# Patient Record
Sex: MALE | Race: WHITE | Employment: FULL TIME | URBAN - METROPOLITAN AREA
[De-identification: names, ages, dates, MRNs, and addresses within clinical notes are randomized per-mention and may not be internally consistent; named-entity substitution may affect disease eponyms.]

---

## 2018-08-06 ENCOUNTER — HOSPITAL ENCOUNTER (EMERGENCY)
Age: 23
Discharge: HOME | End: 2018-08-06

## 2018-08-06 VITALS
OXYGEN SATURATION: 100 % | WEIGHT: 150 LBS | HEART RATE: 62 BPM | TEMPERATURE: 97 F | RESPIRATION RATE: 16 BRPM | HEIGHT: 66 IN | SYSTOLIC BLOOD PRESSURE: 124 MMHG | BODY MASS INDEX: 24.11 KG/M2 | DIASTOLIC BLOOD PRESSURE: 68 MMHG

## 2018-08-06 DIAGNOSIS — H57.12 ACUTE LEFT EYE PAIN: Primary | ICD-10-CM

## 2018-08-06 PROCEDURE — 6370000000 HC RX 637 (ALT 250 FOR IP): Performed by: PHYSICIAN ASSISTANT

## 2018-08-06 PROCEDURE — 99283 EMERGENCY DEPT VISIT LOW MDM: CPT

## 2018-08-06 RX ORDER — IBUPROFEN 600 MG/1
600 TABLET ORAL EVERY 8 HOURS PRN
Qty: 30 TABLET | Refills: 0 | Status: SHIPPED | OUTPATIENT
Start: 2018-08-06 | End: 2019-02-20

## 2018-08-06 RX ORDER — SULFACETAMIDE SODIUM 100 MG/ML
2 SOLUTION/ DROPS OPHTHALMIC
Qty: 1 BOTTLE | Refills: 0 | Status: SHIPPED | OUTPATIENT
Start: 2018-08-06 | End: 2018-08-11

## 2018-08-06 RX ADMIN — FLUORESCEIN SODIUM 1 MG: 1 STRIP OPHTHALMIC at 20:22

## 2018-08-06 ASSESSMENT — PAIN SCALES - GENERAL: PAINLEVEL_OUTOF10: 7

## 2018-08-06 ASSESSMENT — PAIN DESCRIPTION - PAIN TYPE: TYPE: ACUTE PAIN

## 2018-08-06 ASSESSMENT — PAIN DESCRIPTION - DESCRIPTORS: DESCRIPTORS: SHARP

## 2018-08-06 ASSESSMENT — PAIN DESCRIPTION - LOCATION: LOCATION: EYE

## 2018-08-06 ASSESSMENT — VISUAL ACUITY
OU: 20/40
OS: 20/50
OD: 20/50

## 2018-08-06 ASSESSMENT — PAIN DESCRIPTION - ORIENTATION: ORIENTATION: LEFT

## 2019-02-20 ENCOUNTER — HOSPITAL ENCOUNTER (EMERGENCY)
Age: 24
Discharge: HOME | End: 2019-02-21

## 2019-02-20 VITALS
HEART RATE: 105 BPM | RESPIRATION RATE: 16 BRPM | HEIGHT: 66 IN | DIASTOLIC BLOOD PRESSURE: 89 MMHG | BODY MASS INDEX: 25.23 KG/M2 | WEIGHT: 157 LBS | TEMPERATURE: 98.2 F | SYSTOLIC BLOOD PRESSURE: 128 MMHG | OXYGEN SATURATION: 97 %

## 2019-02-20 DIAGNOSIS — B86 SCABIES: ICD-10-CM

## 2019-02-20 DIAGNOSIS — L29.9 ITCHING: ICD-10-CM

## 2019-02-20 DIAGNOSIS — B36.8: Primary | ICD-10-CM

## 2019-02-20 PROCEDURE — 99282 EMERGENCY DEPT VISIT SF MDM: CPT

## 2019-02-21 RX ORDER — HYDROXYZINE HYDROCHLORIDE 25 MG/1
25 TABLET, FILM COATED ORAL EVERY 6 HOURS PRN
Qty: 20 TABLET | Refills: 0 | Status: SHIPPED | OUTPATIENT
Start: 2019-02-21 | End: 2019-03-03

## 2020-01-14 ENCOUNTER — HOSPITAL ENCOUNTER (EMERGENCY)
Age: 25
Discharge: HOME | End: 2020-01-15
Attending: EMERGENCY MEDICINE
Payer: MEDICAID

## 2020-01-14 VITALS
WEIGHT: 155 LBS | RESPIRATION RATE: 16 BRPM | TEMPERATURE: 98.9 F | HEART RATE: 89 BPM | HEIGHT: 66 IN | OXYGEN SATURATION: 97 % | SYSTOLIC BLOOD PRESSURE: 114 MMHG | DIASTOLIC BLOOD PRESSURE: 71 MMHG | BODY MASS INDEX: 24.91 KG/M2

## 2020-01-14 LAB
BILIRUBIN URINE: NEGATIVE
BLOOD, URINE: NEGATIVE
CLARITY: CLEAR
COLOR: YELLOW
GLUCOSE URINE: NEGATIVE MG/DL
KETONES, URINE: NEGATIVE MG/DL
LEUKOCYTE ESTERASE, URINE: NEGATIVE
MICROSCOPIC EXAMINATION: NORMAL
NITRITE, URINE: NEGATIVE
PH UA: 6 (ref 5–8)
PROTEIN UA: NEGATIVE MG/DL
RAPID INFLUENZA  B AGN: NEGATIVE
RAPID INFLUENZA A AGN: NEGATIVE
SPECIFIC GRAVITY UA: >=1.03 (ref 1–1.03)
URINE TYPE: NORMAL
UROBILINOGEN, URINE: 1 E.U./DL

## 2020-01-14 PROCEDURE — 87804 INFLUENZA ASSAY W/OPTIC: CPT

## 2020-01-14 PROCEDURE — 6370000000 HC RX 637 (ALT 250 FOR IP)

## 2020-01-14 PROCEDURE — 99283 EMERGENCY DEPT VISIT LOW MDM: CPT

## 2020-01-14 PROCEDURE — 81003 URINALYSIS AUTO W/O SCOPE: CPT

## 2020-01-14 RX ORDER — ONDANSETRON 4 MG/1
4 TABLET, ORALLY DISINTEGRATING ORAL ONCE
Status: COMPLETED | OUTPATIENT
Start: 2020-01-15 | End: 2020-01-15

## 2020-01-14 RX ORDER — ACETAMINOPHEN 500 MG
1000 TABLET ORAL ONCE
Status: COMPLETED | OUTPATIENT
Start: 2020-01-14 | End: 2020-01-14

## 2020-01-14 RX ORDER — ACETAMINOPHEN 500 MG
TABLET ORAL
Status: COMPLETED
Start: 2020-01-14 | End: 2020-01-14

## 2020-01-14 RX ORDER — DICYCLOMINE HYDROCHLORIDE 10 MG/1
20 CAPSULE ORAL ONCE
Status: COMPLETED | OUTPATIENT
Start: 2020-01-15 | End: 2020-01-15

## 2020-01-14 RX ADMIN — Medication 1000 MG: at 20:23

## 2020-01-14 RX ADMIN — ACETAMINOPHEN 1000 MG: 500 TABLET ORAL at 20:23

## 2020-01-14 ASSESSMENT — PAIN SCALES - GENERAL
PAINLEVEL_OUTOF10: 0
PAINLEVEL_OUTOF10: 0
PAINLEVEL_OUTOF10: 7

## 2020-01-14 ASSESSMENT — PAIN DESCRIPTION - PAIN TYPE: TYPE: ACUTE PAIN

## 2020-01-14 ASSESSMENT — PAIN DESCRIPTION - LOCATION: LOCATION: GENERALIZED

## 2020-01-14 NOTE — LETTER
[facility] [place] [facility]  [address] [facility] [address]  Phone: [phone]             January 15, 2020    Patient: [facility]   YOB: 1995   Date of Visit: 1/14/2020       To Whom It May Concern:    Nan Irvin was seen and treated in our emergency department on 1/14/2020. He may return to work on 1/16/2020.       Sincerely,     Velia DOSS        Signature:__________________________________

## 2020-01-15 PROCEDURE — 6370000000 HC RX 637 (ALT 250 FOR IP): Performed by: EMERGENCY MEDICINE

## 2020-01-15 RX ORDER — DEXTROMETHORPHAN HYDROBROMIDE AND PROMETHAZINE HYDROCHLORIDE 15; 6.25 MG/5ML; MG/5ML
5 SYRUP ORAL 4 TIMES DAILY PRN
Qty: 240 ML | Refills: 0 | Status: SHIPPED | OUTPATIENT
Start: 2020-01-15 | End: 2020-01-22

## 2020-01-15 RX ORDER — DICYCLOMINE HYDROCHLORIDE 10 MG/1
20 CAPSULE ORAL 4 TIMES DAILY PRN
Qty: 40 CAPSULE | Refills: 0 | Status: SHIPPED | OUTPATIENT
Start: 2020-01-15 | End: 2020-01-15 | Stop reason: SDUPTHER

## 2020-01-15 RX ORDER — BENZONATATE 100 MG/1
200 CAPSULE ORAL 3 TIMES DAILY PRN
Qty: 30 CAPSULE | Refills: 0 | Status: SHIPPED | OUTPATIENT
Start: 2020-01-15 | End: 2020-01-15 | Stop reason: SDUPTHER

## 2020-01-15 RX ORDER — DEXTROMETHORPHAN HYDROBROMIDE AND PROMETHAZINE HYDROCHLORIDE 15; 6.25 MG/5ML; MG/5ML
5 SYRUP ORAL 4 TIMES DAILY PRN
Qty: 240 ML | Refills: 0 | Status: SHIPPED | OUTPATIENT
Start: 2020-01-15 | End: 2020-01-15 | Stop reason: SDUPTHER

## 2020-01-15 RX ORDER — BENZONATATE 100 MG/1
200 CAPSULE ORAL 3 TIMES DAILY PRN
Qty: 30 CAPSULE | Refills: 0 | Status: SHIPPED | OUTPATIENT
Start: 2020-01-15 | End: 2020-01-22

## 2020-01-15 RX ORDER — DICYCLOMINE HYDROCHLORIDE 10 MG/1
20 CAPSULE ORAL 4 TIMES DAILY PRN
Qty: 40 CAPSULE | Refills: 0 | Status: SHIPPED | OUTPATIENT
Start: 2020-01-15 | End: 2020-03-16

## 2020-01-15 RX ADMIN — ONDANSETRON 4 MG: 4 TABLET, ORALLY DISINTEGRATING ORAL at 01:13

## 2020-01-15 RX ADMIN — DICYCLOMINE HYDROCHLORIDE 20 MG: 10 CAPSULE ORAL at 01:12

## 2020-01-15 NOTE — ED PROVIDER NOTES
per week: Not on file     Minutes per session: Not on file    Stress: Not on file   Relationships    Social connections:     Talks on phone: Not on file     Gets together: Not on file     Attends Synagogue service: Not on file     Active member of club or organization: Not on file     Attends meetings of clubs or organizations: Not on file     Relationship status: Not on file    Intimate partner violence:     Fear of current or ex partner: Not on file     Emotionally abused: Not on file     Physically abused: Not on file     Forced sexual activity: Not on file   Other Topics Concern    Not on file   Social History Narrative    Not on file     No current facility-administered medications for this encounter. Current Outpatient Medications   Medication Sig Dispense Refill    dicyclomine (BENTYL) 10 MG capsule Take 2 capsules by mouth 4 times daily as needed (abdominal pain/cramping) 40 capsule 0     No Known Allergies    REVIEW OF SYSTEMS  10 systems reviewed, pertinent positives per HPI otherwise noted to be negative. PHYSICAL EXAM  /71   Pulse 89   Temp 98.9 °F (37.2 °C) (Oral)   Resp 16   Ht 5' 6\" (1.676 m)   Wt 155 lb (70.3 kg)   SpO2 97%   BMI 25.02 kg/m²   GENERAL APPEARANCE: Awake and alert. Cooperative. No acute distress, appears fatigued  HEAD: Normocephalic. Atraumatic. EYES: PERRL. EOM's grossly intact. Glassy eyes  ENT: Mucous membranes are moist.  Airway patent, no tonsillar exudates, midline uvula, TMs without bulging/erythema bilaterally  NECK: Supple. No rigidity, normal range of motion  HEART: RRR. No murmurs  LUNGS: Respirations nonlabored. Lungs are clear to auscultation bilaterally. Occasional dry hacking cough  ABDOMEN: Soft. Non-distended. Non-tender. No guarding or rebound. Normal bowel sounds. EXTREMITIES: No peripheral edema. Moves all extremities equally. All extremities neurovascularly intact. SKIN: Warm and dry. No acute rashes.    NEUROLOGICAL: Alert and

## 2020-03-16 ENCOUNTER — HOSPITAL ENCOUNTER (EMERGENCY)
Age: 25
Discharge: HOME | End: 2020-03-16
Attending: EMERGENCY MEDICINE
Payer: MEDICAID

## 2020-03-16 VITALS
HEIGHT: 66 IN | WEIGHT: 161 LBS | RESPIRATION RATE: 16 BRPM | HEART RATE: 75 BPM | SYSTOLIC BLOOD PRESSURE: 107 MMHG | DIASTOLIC BLOOD PRESSURE: 65 MMHG | OXYGEN SATURATION: 100 % | BODY MASS INDEX: 25.88 KG/M2 | TEMPERATURE: 98.4 F

## 2020-03-16 LAB — S PYO AG THROAT QL: POSITIVE

## 2020-03-16 PROCEDURE — 6370000000 HC RX 637 (ALT 250 FOR IP): Performed by: EMERGENCY MEDICINE

## 2020-03-16 PROCEDURE — 87880 STREP A ASSAY W/OPTIC: CPT

## 2020-03-16 PROCEDURE — 99282 EMERGENCY DEPT VISIT SF MDM: CPT

## 2020-03-16 RX ORDER — AMOXICILLIN 500 MG/1
500 CAPSULE ORAL 2 TIMES DAILY
Qty: 20 CAPSULE | Refills: 0 | Status: SHIPPED | OUTPATIENT
Start: 2020-03-16 | End: 2020-03-26

## 2020-03-16 RX ORDER — AMOXICILLIN 500 MG/1
500 CAPSULE ORAL ONCE
Status: COMPLETED | OUTPATIENT
Start: 2020-03-16 | End: 2020-03-16

## 2020-03-16 RX ADMIN — AMOXICILLIN 500 MG: 500 CAPSULE ORAL at 09:28

## 2020-03-16 ASSESSMENT — PAIN DESCRIPTION - PAIN TYPE: TYPE: ACUTE PAIN

## 2020-03-16 ASSESSMENT — PAIN DESCRIPTION - LOCATION: LOCATION: THROAT

## 2020-03-16 ASSESSMENT — PAIN SCALES - GENERAL
PAINLEVEL_OUTOF10: 5
PAINLEVEL_OUTOF10: 5

## 2020-03-16 NOTE — ED PROVIDER NOTES
 Social connections     Talks on phone: Not on file     Gets together: Not on file     Attends Moravian service: Not on file     Active member of club or organization: Not on file     Attends meetings of clubs or organizations: Not on file     Relationship status: Not on file    Intimate partner violence     Fear of current or ex partner: Not on file     Emotionally abused: Not on file     Physically abused: Not on file     Forced sexual activity: Not on file   Other Topics Concern    Not on file   Social History Narrative    Not on file     No current facility-administered medications for this encounter. Current Outpatient Medications   Medication Sig Dispense Refill    amoxicillin (AMOXIL) 500 MG capsule Take 1 capsule by mouth 2 times daily for 10 days 20 capsule 0     No Known Allergies    REVIEW OF SYSTEMS  10 systems reviewed, pertinent positives per HPI otherwise noted to be negative. PHYSICAL EXAM  /85   Pulse 95   Temp 98.4 °F (36.9 °C)   Resp 15   Ht 5' 6\" (1.676 m)   Wt 161 lb (73 kg)   SpO2 100%   BMI 25.99 kg/m²    Physical exam:  General appearance: awake and cooperative. No distress. Non toxic appearing. Skin: Warm and dry. No rashes or lesions. HENT: TMs clear without erythema bulging or effusion. Oropharyngeal erythema with tonsillar exudate on left; no uvular deviation or evidence of PTA. No drooling or stridor. No trismus. Normocephalic. Atraumatic. Mucus membranes are moist   Neck: supple. Right sided LAD. Normal ROM. Eyes: VARUN. EOM intact. Heart: RRR. No murmurs. Lungs: Respirations unlabored. CTAB. No wheezes, rales, or rhonchi. Good air exchange  Abdomen: Soft. Non distended. No peritoneal signs. Musculoskeletal: No extremity edema. Compartments soft. No deformity. No tenderness in the extremities. All extremities neurovascularly intact. Radial, Dp, and PT pulses +2/4 bilaterally  Neurological: Alert and oriented. No focal deficits.  No aphasia or dysarthria. No gait ataxia. Psychiatric: Normal mood and affect. LABS  I have reviewed all labs for this visit. Results for orders placed or performed during the hospital encounter of 03/16/20   Strep screen group a throat   Result Value Ref Range    Rapid Strep A Screen POSITIVE (A) Negative       ED COURSE/MDM  Patient seen and evaluated. Old records reviewed. Labs and imaging reviewed and results discussed with patient. Patient presents with sore throat. Vital signs are within normal limits he is afebrile. He is well-appearing. His rapid strep is positive and he was treated with amoxicillin, first dose given here. No evidence of PTA or retropharyngeal abscess. I spoke with him regarding strict return precautions. He voices understanding. He is discharged in stable condition. During the patient's ED course, the patient was given:  Medications   amoxicillin (AMOXIL) capsule 500 mg (500 mg Oral Given 3/16/20 0928)        CLINICAL IMPRESSION  1. Strep pharyngitis        Blood pressure 134/85, pulse 95, temperature 98.4 °F (36.9 °C), resp. rate 15, height 5' 6\" (1.676 m), weight 161 lb (73 kg), SpO2 100 %. Patient was given scripts for the following medications. I counseled patient how to take these medications. New Prescriptions    AMOXICILLIN (AMOXIL) 500 MG CAPSULE    Take 1 capsule by mouth 2 times daily for 10 days       Follow-up with:  No follow-up provider specified. DISCLAIMER: This chart was created using Dragon dictation software. Efforts were made by me to ensure accuracy, however some errors may be present due to limitations of this technology and occasionally words are not transcribed correctly.        Viktoria [place]  03/16/20 7608

## 2020-08-20 ENCOUNTER — TELEPHONE (OUTPATIENT)
Dept: GASTROENTEROLOGY | Age: 25
End: 2020-08-20

## 2020-08-20 ENCOUNTER — HOSPITAL ENCOUNTER (EMERGENCY)
Age: 25
Discharge: HOME | End: 2020-08-20
Attending: STUDENT IN AN ORGANIZED HEALTH CARE EDUCATION/TRAINING PROGRAM
Payer: COMMERCIAL

## 2020-08-20 VITALS
OXYGEN SATURATION: 99 % | SYSTOLIC BLOOD PRESSURE: 132 MMHG | BODY MASS INDEX: 26.52 KG/M2 | RESPIRATION RATE: 16 BRPM | HEIGHT: 66 IN | TEMPERATURE: 98 F | WEIGHT: 165 LBS | DIASTOLIC BLOOD PRESSURE: 84 MMHG | HEART RATE: 76 BPM

## 2020-08-20 LAB
A/G RATIO: 2.4 (ref 1.1–2.2)
ALBUMIN SERPL-MCNC: 4.8 G/DL (ref 3.4–5)
ALP BLD-CCNC: 64 U/L (ref 40–129)
ALT SERPL-CCNC: 16 U/L (ref 10–40)
ANION GAP SERPL CALCULATED.3IONS-SCNC: 8 MMOL/L (ref 3–16)
AST SERPL-CCNC: 17 U/L (ref 15–37)
BASOPHILS ABSOLUTE: 0.1 K/UL (ref 0–0.2)
BASOPHILS RELATIVE PERCENT: 1 %
BILIRUB SERPL-MCNC: 1.4 MG/DL (ref 0–1)
BUN BLDV-MCNC: 13 MG/DL (ref 7–20)
CALCIUM SERPL-MCNC: 9.2 MG/DL (ref 8.3–10.6)
CHLORIDE BLD-SCNC: 103 MMOL/L (ref 99–110)
CO2: 28 MMOL/L (ref 21–32)
CREAT SERPL-MCNC: 0.7 MG/DL (ref 0.9–1.3)
EOSINOPHILS ABSOLUTE: 0.2 K/UL (ref 0–0.6)
EOSINOPHILS RELATIVE PERCENT: 3.1 %
GFR AFRICAN AMERICAN: >60
GFR NON-AFRICAN AMERICAN: >60
GLOBULIN: 2 G/DL
GLUCOSE BLD-MCNC: 99 MG/DL (ref 70–99)
HCT VFR BLD CALC: 46.7 % (ref 40.5–52.5)
HEMOGLOBIN: 15.9 G/DL (ref 13.5–17.5)
LACTIC ACID: 0.7 MMOL/L (ref 0.4–2)
LIPASE: 20 U/L (ref 13–60)
LYMPHOCYTES ABSOLUTE: 2.2 K/UL (ref 1–5.1)
LYMPHOCYTES RELATIVE PERCENT: 36.1 %
MCH RBC QN AUTO: 32.1 PG (ref 26–34)
MCHC RBC AUTO-ENTMCNC: 34.2 G/DL (ref 31–36)
MCV RBC AUTO: 94 FL (ref 80–100)
MONOCYTES ABSOLUTE: 0.6 K/UL (ref 0–1.3)
MONOCYTES RELATIVE PERCENT: 10.2 %
NEUTROPHILS ABSOLUTE: 3 K/UL (ref 1.7–7.7)
NEUTROPHILS RELATIVE PERCENT: 49.6 %
OCCULT BLOOD DIAGNOSTIC: ABNORMAL
PDW BLD-RTO: 13 % (ref 12.4–15.4)
PLATELET # BLD: 186 K/UL (ref 135–450)
PMV BLD AUTO: 9.4 FL (ref 5–10.5)
POTASSIUM REFLEX MAGNESIUM: 4 MMOL/L (ref 3.5–5.1)
RBC # BLD: 4.97 M/UL (ref 4.2–5.9)
SEDIMENTATION RATE, ERYTHROCYTE: 1 MM/HR (ref 0–15)
SODIUM BLD-SCNC: 139 MMOL/L (ref 136–145)
TOTAL PROTEIN: 6.8 G/DL (ref 6.4–8.2)
WBC # BLD: 6.1 K/UL (ref 4–11)

## 2020-08-20 PROCEDURE — 80053 COMPREHEN METABOLIC PANEL: CPT

## 2020-08-20 PROCEDURE — 83690 ASSAY OF LIPASE: CPT

## 2020-08-20 PROCEDURE — G0328 FECAL BLOOD SCRN IMMUNOASSAY: HCPCS

## 2020-08-20 PROCEDURE — 99284 EMERGENCY DEPT VISIT MOD MDM: CPT

## 2020-08-20 PROCEDURE — 85652 RBC SED RATE AUTOMATED: CPT

## 2020-08-20 PROCEDURE — 83605 ASSAY OF LACTIC ACID: CPT

## 2020-08-20 PROCEDURE — 85025 COMPLETE CBC W/AUTO DIFF WBC: CPT

## 2020-08-20 RX ORDER — IBUPROFEN 400 MG/1
400 TABLET ORAL EVERY 6 HOURS PRN
COMMUNITY
End: 2020-08-21 | Stop reason: CLARIF

## 2020-08-20 ASSESSMENT — PAIN SCALES - GENERAL: PAINLEVEL_OUTOF10: 5

## 2020-08-20 ASSESSMENT — PAIN DESCRIPTION - LOCATION: LOCATION: ABDOMEN

## 2020-08-20 ASSESSMENT — PAIN DESCRIPTION - PAIN TYPE: TYPE: ACUTE PAIN

## 2020-08-20 NOTE — TELEPHONE ENCOUNTER
Per verbal order from Dr. Jessica Woodward schedule a fua with him.  Will reach out to patient once d/c

## 2020-08-20 NOTE — LETTER
[facility] [place] [facility]  [address] [facility] [address]  Phone: [phone]               August 20, 2020    Patient: Ashanti Dunlap [facility]   YOB: 1995   Date of Visit: 8/20/2020       To Whom It May Concern:    Yoselin Coburn was seen and treated in our emergency department on 8/20/2020. He may return to work on 8/21.       Sincerely,       Susan Raymundo RN         Signature:__________________________________

## 2020-08-20 NOTE — ED TRIAGE NOTES
Chief Complaint   Patient presents with    Rectal Bleeding     past month. Pt states bright red blood in stool.  has been dark in color prior to this    Abdominal Pain     cramping shooting stabbing pain x 1 month

## 2020-08-20 NOTE — ED PROVIDER NOTES
[address] ED      CHIEF COMPLAINT  Rectal Bleeding (past month. Pt states bright red blood in stool. has been dark in color prior to this) and Abdominal Pain (cramping shooting stabbing pain x 1 month )       HISTORY OF PRESENT ILLNESS  Karen Olmedo is a 30 y.o. male with a past medical history of asthma who presents to the ED complaining of abdominal pain and rectal bleeding. Abdominal pain for ~1m. Worst in lower quadrants, but sometimes generalized. Cramping/ stabbing pain, intermittent but daily. 5/10. Has been taking pepto bismal. Reports diarrhea. Reports nausea without vomiting. Denies fevers or sick contact. Denies cough. Watery stool with blood in the bowel. History of hemorrhoids and fissue. BRB in stool for >1m, present with every BM. 3-4 episodes of diarrhea/day. Says sometimes only a small amount of blood sometimes large amounts. Denies burning with defecation. No recent abx or exotic travel. Denies drinking lake water. Father with hx of hemorrhoids, denies IBD hx. Denies weight loss, night sweats. No other complaints, modifying factors or associated symptoms. I have reviewed the following from the nursing documentation. Past Medical History:   Diagnosis Date    Asthma      History reviewed. No pertinent surgical history. History reviewed. No pertinent family history.   Social History     Socioeconomic History    Marital status: Single     Spouse name: Not on file    Number of children: Not on file    Years of education: Not on file    Highest education level: Not on file   Occupational History    Not on file   Social Needs    Financial resource strain: Not on file    Food insecurity     Worry: Not on file     Inability: Not on file    Transportation needs     Medical: Not on file     Non-medical: Not on file   Tobacco Use    Smoking status: Current Every Day Smoker     Packs/day: 1.00     Types: Cigarettes    Smokeless tobacco: Never Used   Substance extremities. All extremities neurovascularly intact. SKIN: Warm and dry. No acute rashes. NEUROLOGICAL: Alert and oriented. CN's 2-12 intact. No gross facial drooping. Strength 5/5, sensation intact. PSYCHIATRIC: Normal mood and affect. LABS  I have reviewed all labs for this visit.    Results for orders placed or performed during the hospital encounter of 08/20/20   CBC Auto Differential   Result Value Ref Range    WBC 6.1 4.0 - 11.0 K/uL    RBC 4.97 4.20 - 5.90 M/uL    Hemoglobin 15.9 13.5 - 17.5 g/dL    Hematocrit 46.7 40.5 - 52.5 %    MCV 94.0 80.0 - 100.0 fL    MCH 32.1 26.0 - 34.0 pg    MCHC 34.2 31.0 - 36.0 g/dL    RDW 13.0 12.4 - 15.4 %    Platelets 057 395 - 373 K/uL    MPV 9.4 5.0 - 10.5 fL    Neutrophils % 49.6 %    Lymphocytes % 36.1 %    Monocytes % 10.2 %    Eosinophils % 3.1 %    Basophils % 1.0 %    Neutrophils Absolute 3.0 1.7 - 7.7 K/uL    Lymphocytes Absolute 2.2 1.0 - 5.1 K/uL    Monocytes Absolute 0.6 0.0 - 1.3 K/uL    Eosinophils Absolute 0.2 0.0 - 0.6 K/uL    Basophils Absolute 0.1 0.0 - 0.2 K/uL   Comprehensive Metabolic Panel w/ Reflex to MG   Result Value Ref Range    Sodium 139 136 - 145 mmol/L    Potassium reflex Magnesium 4.0 3.5 - 5.1 mmol/L    Chloride 103 99 - 110 mmol/L    CO2 28 21 - 32 mmol/L    Anion Gap 8 3 - 16    Glucose 99 70 - 99 mg/dL    BUN 13 7 - 20 mg/dL    CREATININE 0.7 (L) 0.9 - 1.3 mg/dL    GFR Non-African American >60 >60    GFR African American >60 >60    Calcium 9.2 8.3 - 10.6 mg/dL    Total Protein 6.8 6.4 - 8.2 g/dL    Alb 4.8 3.4 - 5.0 g/dL    Albumin/Globulin Ratio 2.4 (H) 1.1 - 2.2    Total Bilirubin 1.4 (H) 0.0 - 1.0 mg/dL    Alkaline Phosphatase 64 40 - 129 U/L    ALT 16 10 - 40 U/L    AST 17 15 - 37 U/L    Globulin 2.0 g/dL   Lipase   Result Value Ref Range    Lipase 20.0 13.0 - 60.0 U/L   Sedimentation Rate   Result Value Ref Range    Sed Rate 1 0 - 15 mm/Hr   Lactic Acid, Plasma   Result Value Ref Range    Lactic Acid 0.7 0.4 - 2.0 mmol/L   Blood Occult Stool Diagnostic   Result Value Ref Range    Occult Blood Diagnostic Result: POSITIVE  Normal range: Negative   (A)        During the patient's ED course, the patient was given:  Medications - No data to display     ED COURSE/MDM  Patient seen and evaluated. Old records reviewed. Labs and imaging reviewed and results discussed with patient. Overall, well appearing patient in no acute distress, presenting for cramping lower abdominal pain and bright red blood per rectum. History of present illness significant for symptoms present for approximately 1 month, abdominal pain is mild, patient does not feel that rectal bleeding was large-volume. Physical exam remarkable for mild abdominal tenderness to palpation on exam, no bright red blood on rectal exam    Based off patient's symptoms, concern for inflammatory bowel disease. Particularly given patient's reported history of hemorrhoids and fissures. I do not appreciate any hemorrhoids or fissures on exam.  He did not have any bright red blood per rectum on exam.  Patient off history and exam, low suspicion for diverticulitis, infectious colitis, AVM, malignancy. Doubt mesenteric ischemia. Laboratory studies very reassuring. Patient reports diarrhea but has no evidence of electrolyte abnormalities or dehydration. His kidney function is within normal limits. His liver function is unremarkable and he has no evidence of lipase elevation. Low suspicion for pancreatitis. Patient has no leukocytosis. Patient has no anemia or platelet abnormality. Patient's lactate is within normal limits. ESR is within normal limits, if patient has amatory bowel disease, it does not appear that he is having flare at this time. Patient does not have any anemia, he is not losing significant amount of blood over the past month. Patient has been having symptoms for 1 month. He has no risk factors for mesenteric ischemia.   Patient's lactate is within normal limits. Low suspicion for mesenteric ischemia at this time. Patient's abdominal exam is not consistent with appendicitis. Patient is afebrile and has no white count, low concern for diverticulitis, especially given the duration of symptoms. I was very reassured by the patient's abdominal exam, he had minimal tenderness to palpation, no guarding, no distention, no evidence of peritoneal signs. Discussed patient's laboratory values with the patient. We discussed that I was reassured by these values, but did discuss the possibility of a CT scan of the abdomen and pelvis. Patient reports that his pain is overall mild and that he is reassured by these laboratory studies. At this time he is agreeable to not have CT scan completed. Given my low suspicion for surgical pathology, or urgent pathology that requires CT scan to diagnose, I feel that deferring CT scan at this time is appropriate. Spoke to gastroenterology. They were reassured by the patient's laboratory values. Stated that outpatient follow-up is appropriate. We discussed that patient likely requires colonoscopy. They stated the patient could follow-up in the clinic tomorrow for evaluation and further planning for colonoscopy. At this time, given that patient is hemodynamically stable without evidence of large volume rectal bleeding, do feel that outpatient follow-up would be appropriate. Spoke to patient, through shared decision making he agreed with outpatient management of his symptoms. At this time, given reassuring exam and laboratory values, feel the patient can be discharged follow-up with GI tomorrow. Patient given very strict return precautions. Discharged in stable condition.     I estimate there is LOW risk for ACUTE APPENDICITIS, BOWEL OBSTRUCTION, ACUTE CHOLECYSTITIS, RUPTURED DIVERTICULITIS, INCARCERATED or STRANGULATED HERNIA, HEMMORHAGIC PANCREATITIS, or PERFORATED BOWEL/ULCER, thus I consider the discharge disposition reasonable. Mr Jordan Upton and I have discussed the diagnosis and risks, and we agree with discharging home with close follow-up. We also discussed returning to the Emergency Department immediately if new or worsening symptoms occur. We have discussed the symptoms which are most concerning that necessitate immediate return. CLINICAL IMPRESSION  1. Lower abdominal pain    2. Gastrointestinal hemorrhage, unspecified gastrointestinal hemorrhage type        Blood pressure 135/89, pulse 65, temperature 98 °F (36.7 °C), temperature source Oral, resp. rate 14, height 5' 6\" (1.676 m), weight 165 lb (74.8 kg). Nakul Perez was discharged to home in stable condition. Patient was given scripts for the following medications. I counseled patient how to take these medications. Discharge Medication List as of 8/20/2020  2:43 PM          Follow-up with:  Gastroenterology  [phone]  Schedule an appointment as soon as possible for a visit       [facility]  [address] [facility]  [phone]  Go to   As needed, If symptoms worsen      DISCLAIMER: This chart was created using Dragon dictation software. Efforts were made by me to ensure accuracy, however some errors may be present due to limitations of this technology and occasionally words are not transcribed correctly.       Rush Brandt MD  08/20/20 1692

## 2020-08-20 NOTE — ED NOTES
[address] [facility] [phone] for consult. Neida Olvera  08/20/20 [phone] - GI called back.       Neida Olvera  08/20/20 9296

## 2020-08-20 NOTE — PROGRESS NOTES
GI note: spoke to ED attending about pt, 1 month of 3 watery daily stools with blood, lower abdominal pain. Benign exam per ED. Labs with Hb 15.9, WBC normal.  Would defer decision to discharge or admit to ED but based on history and stable labs and exam it sounds like he could have further workup as an outpatient.   We will get him in to see us in the office for a visit tomorrow

## 2020-08-21 ENCOUNTER — OFFICE VISIT (OUTPATIENT)
Dept: GASTROENTEROLOGY | Age: 25
End: 2020-08-21
Payer: COMMERCIAL

## 2020-08-21 VITALS
WEIGHT: 157 LBS | DIASTOLIC BLOOD PRESSURE: 80 MMHG | SYSTOLIC BLOOD PRESSURE: 118 MMHG | HEIGHT: 66 IN | TEMPERATURE: 98 F | BODY MASS INDEX: 25.23 KG/M2

## 2020-08-21 PROBLEM — R10.30 LOWER ABDOMINAL PAIN: Status: ACTIVE | Noted: 2020-08-21

## 2020-08-21 PROBLEM — K62.5 RECTAL BLEEDING: Status: ACTIVE | Noted: 2020-08-21

## 2020-08-21 PROBLEM — R19.7 DIARRHEA: Status: ACTIVE | Noted: 2020-08-21

## 2020-08-21 PROCEDURE — G8419 CALC BMI OUT NRM PARAM NOF/U: HCPCS | Performed by: INTERNAL MEDICINE

## 2020-08-21 PROCEDURE — G8427 DOCREV CUR MEDS BY ELIG CLIN: HCPCS | Performed by: INTERNAL MEDICINE

## 2020-08-21 PROCEDURE — 99204 OFFICE O/P NEW MOD 45 MIN: CPT | Performed by: INTERNAL MEDICINE

## 2020-08-21 PROCEDURE — 4004F PT TOBACCO SCREEN RCVD TLK: CPT | Performed by: INTERNAL MEDICINE

## 2020-08-21 RX ORDER — DICYCLOMINE HYDROCHLORIDE 10 MG/1
10 CAPSULE ORAL EVERY 8 HOURS PRN
Qty: 40 CAPSULE | Refills: 0 | Status: SHIPPED | OUTPATIENT
Start: 2020-08-21

## 2020-08-21 RX ORDER — CALCIUM CARBONATE 200(500)MG
1 TABLET,CHEWABLE ORAL DAILY
COMMUNITY

## 2020-08-21 RX ORDER — POLYETHYLENE GLYCOL 3350 17 G/17G
238 POWDER ORAL ONCE
Qty: 238 G | Refills: 0 | Status: SHIPPED | OUTPATIENT
Start: 2020-08-21 | End: 2020-08-21

## 2020-08-21 RX ORDER — BISACODYL 5 MG
TABLET, DELAYED RELEASE (ENTERIC COATED) ORAL
Qty: 4 TABLET | Refills: 0 | Status: ON HOLD
Start: 2020-08-21 | End: 2020-09-10 | Stop reason: HOSPADM

## 2020-08-21 NOTE — LETTER
COLONOSCOPY PREP INSTRUCTIONS  MlRALAX SPLIT DOSE   [facility] PHYSICIAN ENDOSCOPY    Your colonoscopy is scheduled on: _8/28/20 @ 9:00am_   __Araceli/Mark/Nunu_  Arrival Time: __8:00am_   DO NOT EAT OR DRINK (INCLUDING WATER) AFTER: _3:00am_  's Name_[facility] [phone]  [facility]- [phone]    Please get tested for Covid on 8/22/20    Keep these papers together; REVIEW ALL OF THEM AT LEAST 7 DAYS BEFORE THE PROCEDURE. Please bring a current list of your medications, to avoid delays in the admission process. The following instructions must be followed in order to ensure your procedure has optimal outcomes. - KEEP YOUR APPOINTMENT. If for any reason, you are unable to keep your appointment, please notify us within 72 hours before your procedure. - You MUST have a responsible adult to drive you, who MUST remain at our facility the ENTIRE time. If not the procedure will be cancelled. You may go by taxi ONLY if you have a responsible adult with you. You may experience light headedness, dizziness etc., therefore you should have a responsible adult remain with you until the morning after your procedure. - Bring your insurance card and 's license. Call your insurance carrier to verify your benefits, and confirm that our facility is in your network, prior to the procedure date to ensure coverage. The facility name is listed as [facility] or [place] [address]  and the tax ID# is [id number].  - Due to the safety and privacy of our patients, only one visitor is allowed in the recovery area after the procedure. The center will not be responsible for lost valuables so please leave them at home. - Try to avoid seeds (strawberries, markie, and rye) for one week prior to your procedure.   - If you have questions after beginning the prep, call between 8:30 am & 4:30pm you will speak to a nurse or medical assistant, after 4:30pm you will reach the physician on call. - Hydration (body fluid) is the most important aspect of an effective and safe prep. If you are not drinking enough fluid you may experience cramping, nausea and dizziness. - Common side effects of the prep are nausea, bloating and shaking chills. If any of these occur, take a break from the prep (30 minutes to 1 hour) and then restart. If unable to complete after that notify the Physician as your procedure may need to be cancelled. Nausea medication or an alternative prep is sometimes called in.  - Do not leave home after starting the prep. Frequent, liquid stools may begin as soon as 15 minutes after the first bottle, although it could take up to 4 hours or longer for your first bowel movement. - Even if your stools are clear and watery in appearance, TAKE ALL OF THE PREP.  - Using baby wipes and nonprescription ointments, such as Desitin, will help with the irritation caused by frequent loose stools. - Due to unforeseen schedule changes, you may be asked to move your appointment time to an earlier/later time slot. To insure that your prep gives you the best results for your Colonoscopy, Please follow all directions closely. 3-5 days prior to your procedure:  1. Begin a low-fiber diet (no corn, dried beans, tomatoes, nuts, seeds, lettuce). 2. No bran or bulking agents. 3. Stop taking your multivitamin or iron supplements.   4. If you currently take Aggrenox, Dipyridamole, Persantine, Fondaparinux sodium (Arixtra), Dalteparin sodium [place] Crease), Warfarin (Coumadin), Clopidogrel (Plavix), Prasugrel (Effient), Enoxaparin, Lovenox, or Heparin, Cilostazol (Pletal), Rivoroxaban (Xarelto), Desirudin (Iprivask), Cyclopentyltrazolopyrimide (Ticagrelor or Brilinta), Cangrelor ([place]), Dabigatran (Pradaxa), Apixaban (Eliquis), Edoxaban (Savaysa)you should the day of service. During this call a skilled associate will review your demographic and insurance benefits information. The associate will answer any question pertaining to your insurance contract, such as deductible/co-insurance/ co-pay or any other financial concerns. They may offer an amount that you could pay on the day of surgery but this is not a requirement. Thank you for allowing [facility] to be part of your [address] [facility]  [address], [address] [facility] [address] Is located at the intersection of [address] [place] [address] and [facility], next to [facility]. From [address]: Exit at [address]. Travel on [address] past [facility] and turn right onto [facility].  Then turn left into the main driveway facing the [facility], bare to the right of the driveway and look for the building to the right of the hospital.    If you need further directions, pleae call our main number at [phone]

## 2020-08-21 NOTE — PROGRESS NOTES
[address]  [facility]  Phone: [phone]    CHIEF COMPLAINT     Chief Complaint   Patient presents with    Follow-Up from Hospital     abd pain/rectal bleeding        HPI     Mercedes Watts is a 30 y.o. male who presents for rectal bleeding, diarrhea, abdominal pain. Patient states for the last 1 month he has had a rectal bleeding frequently almost every bowel movement with bright red blood in moderate amounts that may fill up the toilet bowl. He has 3-4 mostly loose bowel movements daily and this has been going on for months but worse last month. He has lower abdominal cramping and pain intermittently, worse before bowel movements. Pain is mild to moderate. He says he feels sick and has nausea. No emesis. Drinks alcohol 2-3 drinks 4 times a week. Denies drug use. He denies any family history of Crohns or colon cancer. He has never had a colonoscopy. Seen in ED yesterday for this and had CBC, CMP normal. Stool occult blood was positive. PAST MEDICAL HISTORY     Past Medical History:   Diagnosis Date    Asthma      FAMILY HISTORY   History reviewed. No pertinent family history.   SOCIAL HISTORY     Social History     Socioeconomic History    Marital status: Single     Spouse name: Not on file    Number of children: Not on file    Years of education: Not on file    Highest education level: Not on file   Occupational History    Not on file   Social Needs    Financial resource strain: Not on file    Food insecurity     Worry: Not on file     Inability: Not on file    Transportation needs     Medical: Not on file     Non-medical: Not on file   Tobacco Use    Smoking status: Current Every Day Smoker     Packs/day: 1.00     Types: Cigarettes    Smokeless tobacco: Never Used   Substance and Sexual Activity    Alcohol use: Not Currently     Comment: rarely     Drug use: No    Sexual activity: Yes     Partners: Female Lifestyle    Physical activity     Days per week: Not on file     Minutes per session: Not on file    Stress: Not on file   Relationships    Social connections     Talks on phone: Not on file     Gets together: Not on file     Attends [employer] service: Not on file     Active member of club or organization: Not on file     Attends meetings of clubs or organizations: Not on file     Relationship status: Not on file    Intimate partner violence     Fear of current or ex partner: Not on file     Emotionally abused: Not on file     Physically abused: Not on file     Forced sexual activity: Not on file   Other Topics Concern    Not on file   Social History Narrative    Not on file     SURGICAL HISTORY   History reviewed. No pertinent surgical history. CURRENT MEDICATIONS   (This list may include medications prescribed during this encounter as epic can not insert only the list prior to this encounter.)  Current Outpatient Rx   Medication Sig Dispense Refill    calcium carbonate (TUMS) 500 MG chewable tablet Take 1 tablet by mouth daily          ALLERGIES   No Known Allergies    IMMUNIZATIONS     There is no immunization history on file for this patient. REVIEW OF SYSTEMS     Constitutional: denies fever and unexpected weight change. HENT: Negative for ear pain, hearing loss and nosebleeds. Eyes: Negative for pain and visual disturbance. Respiratory: Negative for cough, shortness of breath and wheezing. Cardiovascular: Negative for chest pain, palpitations and leg swelling. Gastrointestinal: see HPI for details. Endocrine: Negative for polydipsia, polyphagia and polyuria. Genitourinary: Negative for difficulty urinating, dysuria, hematuria and urgency. Musculoskeletal: Positive for arthralgias and back pain. Skin: Negative for pallor and rash. Allergic/Immunologic: Negative for environmental allergies and immunocompromised state. Neurological: Negative for seizures, syncope.

## 2020-08-25 ENCOUNTER — TELEPHONE (OUTPATIENT)
Dept: GASTROENTEROLOGY | Age: 25
End: 2020-08-25

## 2020-09-03 NOTE — PROGRESS NOTES
Obstructive Sleep Apnea (EMILY) Screening     Patient:  Rosalva Jean    YOB: 1995      Medical Record #:  [id number]                     Date:  9/3/2020     1. Are you a loud and/or regular snorer? []  Yes       [x] No    2. Have you been observed to gasp or stop breathing during sleep? []  Yes       [x] No    3. Do you feel tired or groggy upon awakening or do you awaken with a headache?           []  Yes       [] No    4. Are you often tired or fatigued during the wake time hours? []  Yes       [] No    5. Do you fall asleep sitting, reading, watching TV or driving? []  Yes       [] No    6. Do you often have problems with memory or concentration? []  Yes       [] No    **If patient's score is ? 3 they are considered high risk for EMILY. An Anesthesia provider will evaluate the patient and develop a plan of care the day of surgery. Note:  If the patient's BMI is more than 35 kg m¯² , has neck circumference > 40 cm, and/or high blood pressure the risk is greater (© American [employer]nea Association, 2006).

## 2020-09-04 ENCOUNTER — HOSPITAL ENCOUNTER (OUTPATIENT)
Age: 25
Discharge: HOME | End: 2020-09-04
Payer: COMMERCIAL

## 2020-09-04 PROCEDURE — U0003 INFECTIOUS AGENT DETECTION BY NUCLEIC ACID (DNA OR RNA); SEVERE ACUTE RESPIRATORY SYNDROME CORONAVIRUS 2 (SARS-COV-2) (CORONAVIRUS DISEASE [COVID-19]), AMPLIFIED PROBE TECHNIQUE, MAKING USE OF HIGH THROUGHPUT TECHNOLOGIES AS DESCRIBED BY CMS-2020-01-R: HCPCS

## 2020-09-05 LAB — SARS-COV-2, NAA: NOT DETECTED

## 2020-09-10 ENCOUNTER — ANESTHESIA (OUTPATIENT)
Dept: ENDOSCOPY | Age: 25
End: 2020-09-10
Payer: COMMERCIAL

## 2020-09-10 ENCOUNTER — TELEPHONE (OUTPATIENT)
Dept: GASTROENTEROLOGY | Age: 25
End: 2020-09-10

## 2020-09-10 ENCOUNTER — HOSPITAL ENCOUNTER (OUTPATIENT)
Age: 25
Setting detail: OUTPATIENT SURGERY
Discharge: HOME | End: 2020-09-10
Attending: INTERNAL MEDICINE | Admitting: INTERNAL MEDICINE
Payer: COMMERCIAL

## 2020-09-10 ENCOUNTER — ANESTHESIA EVENT (OUTPATIENT)
Dept: ENDOSCOPY | Age: 25
End: 2020-09-10
Payer: COMMERCIAL

## 2020-09-10 VITALS — OXYGEN SATURATION: 100 % | SYSTOLIC BLOOD PRESSURE: 85 MMHG | DIASTOLIC BLOOD PRESSURE: 49 MMHG

## 2020-09-10 VITALS
HEIGHT: 66 IN | OXYGEN SATURATION: 96 % | HEART RATE: 58 BPM | DIASTOLIC BLOOD PRESSURE: 41 MMHG | TEMPERATURE: 97 F | WEIGHT: 157 LBS | BODY MASS INDEX: 25.23 KG/M2 | SYSTOLIC BLOOD PRESSURE: 104 MMHG | RESPIRATION RATE: 12 BRPM

## 2020-09-10 PROCEDURE — 88305 TISSUE EXAM BY PATHOLOGIST: CPT

## 2020-09-10 PROCEDURE — 3700000001 HC ADD 15 MINUTES (ANESTHESIA): Performed by: INTERNAL MEDICINE

## 2020-09-10 PROCEDURE — 7100000010 HC PHASE II RECOVERY - FIRST 15 MIN: Performed by: INTERNAL MEDICINE

## 2020-09-10 PROCEDURE — 7100000011 HC PHASE II RECOVERY - ADDTL 15 MIN: Performed by: INTERNAL MEDICINE

## 2020-09-10 PROCEDURE — 6360000002 HC RX W HCPCS: Performed by: NURSE ANESTHETIST, CERTIFIED REGISTERED

## 2020-09-10 PROCEDURE — 2709999900 HC NON-CHARGEABLE SUPPLY: Performed by: INTERNAL MEDICINE

## 2020-09-10 PROCEDURE — 3700000000 HC ANESTHESIA ATTENDED CARE: Performed by: INTERNAL MEDICINE

## 2020-09-10 PROCEDURE — 3609009300 HC COLONOSCOPY BIOPSY/STOMA: Performed by: INTERNAL MEDICINE

## 2020-09-10 PROCEDURE — 45380 COLONOSCOPY AND BIOPSY: CPT | Performed by: INTERNAL MEDICINE

## 2020-09-10 PROCEDURE — 2500000003 HC RX 250 WO HCPCS: Performed by: NURSE ANESTHETIST, CERTIFIED REGISTERED

## 2020-09-10 PROCEDURE — 2580000003 HC RX 258: Performed by: INTERNAL MEDICINE

## 2020-09-10 RX ORDER — MONTELUKAST SODIUM 4 MG/1
1 TABLET, CHEWABLE ORAL 2 TIMES DAILY
Qty: 60 TABLET | Refills: 3 | Status: SHIPPED | OUTPATIENT
Start: 2020-09-10

## 2020-09-10 RX ORDER — PROPOFOL 10 MG/ML
INJECTION, EMULSION INTRAVENOUS PRN
Status: DISCONTINUED | OUTPATIENT
Start: 2020-09-10 | End: 2020-09-10 | Stop reason: SDUPTHER

## 2020-09-10 RX ORDER — DIPHENHYDRAMINE HYDROCHLORIDE 50 MG/ML
12.5 INJECTION INTRAMUSCULAR; INTRAVENOUS
Status: DISCONTINUED | OUTPATIENT
Start: 2020-09-10 | End: 2020-09-10 | Stop reason: HOSPADM

## 2020-09-10 RX ORDER — MORPHINE SULFATE 2 MG/ML
2 INJECTION, SOLUTION INTRAMUSCULAR; INTRAVENOUS EVERY 5 MIN PRN
Status: DISCONTINUED | OUTPATIENT
Start: 2020-09-10 | End: 2020-09-10 | Stop reason: HOSPADM

## 2020-09-10 RX ORDER — ONDANSETRON 2 MG/ML
4 INJECTION INTRAMUSCULAR; INTRAVENOUS PRN
Status: DISCONTINUED | OUTPATIENT
Start: 2020-09-10 | End: 2020-09-10 | Stop reason: HOSPADM

## 2020-09-10 RX ORDER — PROMETHAZINE HYDROCHLORIDE 25 MG/ML
6.25 INJECTION, SOLUTION INTRAMUSCULAR; INTRAVENOUS
Status: DISCONTINUED | OUTPATIENT
Start: 2020-09-10 | End: 2020-09-10 | Stop reason: HOSPADM

## 2020-09-10 RX ORDER — HYDRALAZINE HYDROCHLORIDE 20 MG/ML
5 INJECTION INTRAMUSCULAR; INTRAVENOUS EVERY 10 MIN PRN
Status: DISCONTINUED | OUTPATIENT
Start: 2020-09-10 | End: 2020-09-10 | Stop reason: HOSPADM

## 2020-09-10 RX ORDER — MORPHINE SULFATE 2 MG/ML
1 INJECTION, SOLUTION INTRAMUSCULAR; INTRAVENOUS EVERY 5 MIN PRN
Status: DISCONTINUED | OUTPATIENT
Start: 2020-09-10 | End: 2020-09-10 | Stop reason: HOSPADM

## 2020-09-10 RX ORDER — LABETALOL HYDROCHLORIDE 5 MG/ML
5 INJECTION, SOLUTION INTRAVENOUS EVERY 10 MIN PRN
Status: DISCONTINUED | OUTPATIENT
Start: 2020-09-10 | End: 2020-09-10 | Stop reason: HOSPADM

## 2020-09-10 RX ORDER — OXYCODONE HYDROCHLORIDE AND ACETAMINOPHEN 5; 325 MG/1; MG/1
2 TABLET ORAL PRN
Status: DISCONTINUED | OUTPATIENT
Start: 2020-09-10 | End: 2020-09-10 | Stop reason: HOSPADM

## 2020-09-10 RX ORDER — LIDOCAINE HYDROCHLORIDE 20 MG/ML
INJECTION, SOLUTION INFILTRATION; PERINEURAL PRN
Status: DISCONTINUED | OUTPATIENT
Start: 2020-09-10 | End: 2020-09-10 | Stop reason: SDUPTHER

## 2020-09-10 RX ORDER — MEPERIDINE HYDROCHLORIDE 50 MG/ML
12.5 INJECTION INTRAMUSCULAR; INTRAVENOUS; SUBCUTANEOUS EVERY 5 MIN PRN
Status: DISCONTINUED | OUTPATIENT
Start: 2020-09-10 | End: 2020-09-10 | Stop reason: HOSPADM

## 2020-09-10 RX ORDER — OXYCODONE HYDROCHLORIDE AND ACETAMINOPHEN 5; 325 MG/1; MG/1
1 TABLET ORAL PRN
Status: DISCONTINUED | OUTPATIENT
Start: 2020-09-10 | End: 2020-09-10 | Stop reason: HOSPADM

## 2020-09-10 RX ORDER — SODIUM CHLORIDE, SODIUM LACTATE, POTASSIUM CHLORIDE, CALCIUM CHLORIDE 600; 310; 30; 20 MG/100ML; MG/100ML; MG/100ML; MG/100ML
INJECTION, SOLUTION INTRAVENOUS ONCE
Status: COMPLETED | OUTPATIENT
Start: 2020-09-10 | End: 2020-09-10

## 2020-09-10 RX ADMIN — PROPOFOL 60 MG: 10 INJECTION, EMULSION INTRAVENOUS at 11:48

## 2020-09-10 RX ADMIN — PROPOFOL 20 MG: 10 INJECTION, EMULSION INTRAVENOUS at 11:58

## 2020-09-10 RX ADMIN — PROPOFOL 30 MG: 10 INJECTION, EMULSION INTRAVENOUS at 11:54

## 2020-09-10 RX ADMIN — PROPOFOL 140 MG: 10 INJECTION, EMULSION INTRAVENOUS at 11:46

## 2020-09-10 RX ADMIN — PROPOFOL 40 MG: 10 INJECTION, EMULSION INTRAVENOUS at 11:51

## 2020-09-10 RX ADMIN — PROPOFOL 30 MG: 10 INJECTION, EMULSION INTRAVENOUS at 11:56

## 2020-09-10 RX ADMIN — LIDOCAINE HYDROCHLORIDE 100 MG: 20 INJECTION, SOLUTION INFILTRATION; PERINEURAL at 11:46

## 2020-09-10 RX ADMIN — PROPOFOL 40 MG: 10 INJECTION, EMULSION INTRAVENOUS at 12:05

## 2020-09-10 RX ADMIN — PROPOFOL 20 MG: 10 INJECTION, EMULSION INTRAVENOUS at 12:01

## 2020-09-10 RX ADMIN — PROPOFOL 20 MG: 10 INJECTION, EMULSION INTRAVENOUS at 12:07

## 2020-09-10 RX ADMIN — SODIUM CHLORIDE, POTASSIUM CHLORIDE, SODIUM LACTATE AND CALCIUM CHLORIDE: 600; 310; 30; 20 INJECTION, SOLUTION INTRAVENOUS at 11:01

## 2020-09-10 ASSESSMENT — PAIN - FUNCTIONAL ASSESSMENT: PAIN_FUNCTIONAL_ASSESSMENT: 0-10

## 2020-09-10 NOTE — H&P
on file       Minutes per session: Not on file    Stress: Not on file   Relationships    Social connections       Talks on phone: Not on file       Gets together: Not on file       Attends [employer] service: Not on file       Active member of club or organization: Not on file       Attends meetings of clubs or organizations: Not on file       Relationship status: Not on file    Intimate partner violence       Fear of current or ex partner: Not on file       Emotionally abused: Not on file       Physically abused: Not on file       Forced sexual activity: Not on file   Other Topics Concern    Not on file   Social History Narrative    Not on file        SURGICAL HISTORY   Past Surgical History   History reviewed. No pertinent surgical history. CURRENT MEDICATIONS   (This list may include medications prescribed during this encounter as epic can not insert only the list prior to this encounter.)  Current Outpatient Rx          Current Outpatient Rx   Medication Sig Dispense Refill    calcium carbonate (TUMS) 500 MG chewable tablet Take 1 tablet by mouth daily                ALLERGIES   No Known Allergies     IMMUNIZATIONS     There is no immunization history on file for this patient.     REVIEW OF SYSTEMS      Constitutional: denies fever and unexpected weight change. HENT: Negative for ear pain, hearing loss and nosebleeds. Eyes: Negative for pain and visual disturbance. Respiratory: Negative for cough, shortness of breath and wheezing. Cardiovascular: Negative for chest pain, palpitations and leg swelling. Gastrointestinal: see HPI for details. Endocrine: Negative for polydipsia, polyphagia and polyuria. Genitourinary: Negative for difficulty urinating, dysuria, hematuria and urgency. Musculoskeletal: Positive for arthralgias and back pain. Skin: Negative for pallor and rash. Allergic/Immunologic: Negative for environmental allergies and immunocompromised state.    Neurological: Negative

## 2020-09-10 NOTE — ANESTHESIA POSTPROCEDURE EVALUATION
Department of Anesthesiology  Postprocedure Note    Patient: Mildred Jacobo  MRN: [id number]  YOB: 1995  Date of evaluation: 9/10/2020  Time:  1:12 PM     Procedure Summary     Date:  09/10/20 Room / Location:  [facility] [address] [place] [address]    Anesthesia Start:  1143 Anesthesia Stop:  3537    Procedure:  COLONOSCOPY BIOPSY (N/A ) Diagnosis:  (Rectal bleeding, Diarrhea, Lower Abd Pain)    Surgeon:  Christopher Mcdonald MD Responsible Provider:  Nazia Prieto MD    Anesthesia Type:  MAC ASA Status:  2          Anesthesia Type: MAC    Kelly Phase I: Kelly Score: 10    Kelly Phase II: Kelly Score: 9    Last vitals: Reviewed and per EMR flowsheets.        Anesthesia Post Evaluation    Comments: Postoperative Anesthesia Note    Name:    Mildred Jacobo  MRN:      [id number]    Patient Vitals in the past 12 hrs:  09/10/20 1220, BP:(!) 104/41, Pulse:58, Resp:12, SpO2:96 %  09/10/20 1215, BP:(!) 87/45, Pulse:66, Resp:14, SpO2:96 %  09/10/20 1052, BP:121/73, Temp:97 °F (36.1 °C), Pulse:67, Resp:16, SpO2:100 %, Height:5' 6\" (1.676 m), Weight:157 lb (71.2 kg)     LABS:    CBC  Lab Results       Component                Value               Date/Time                  WBC                      6.1                 08/20/2020 09:46 AM        HGB                      15.9                08/20/2020 09:46 AM        HCT                      46.7                08/20/2020 09:46 AM        PLT                      186                 08/20/2020 09:46 AM   RENAL  Lab Results       Component                Value               Date/Time                  NA                       139                 08/20/2020 09:46 AM        K                        4.0                 08/20/2020 09:46 AM        CL                       103                 08/20/2020 09:46 AM        CO2                      28                  08/20/2020 09:46 AM        BUN                      13                  08/20/2020 09:46 AM CREATININE               0.7 (L)             08/20/2020 09:46 AM        GLUCOSE                  99                  08/20/2020 09:46 AM   COAGS  No results found for: PROTIME, INR, APTT    Intake & Output:  @68RZSA@    Nausea & Vomiting:  No    Level of Consciousness:  Awake    Pain Assessment:  Adequate analgesia    Anesthesia Complications:  No apparent anesthetic complications    SUMMARY      Vital signs stable  OK to discharge from Stage I post anesthesia care.   Care transferred from Anesthesiology department on discharge from perioperative area

## 2020-09-10 NOTE — ANESTHESIA PRE PROCEDURE
Department of Anesthesiology  Preprocedure Note       Name:  Carla Pittman   Age:  30 y.o.  :  1995                                          MRN:  [id number]         Date:  9/10/2020      Surgeon: Kip Egan):  Betsey Muñoz MD    Procedure: Procedure(s):  COLONOSCOPY With Anesthesia    Medications prior to admission:   Prior to Admission medications    Medication Sig Start Date End Date Taking? Authorizing Provider   calcium carbonate (TUMS) 500 MG chewable tablet Take 1 tablet by mouth daily   Yes Historical Provider, MD   dicyclomine (BENTYL) 10 MG capsule Take 1 capsule by mouth every 8 hours as needed (abdominal pain) 20  Yes Betsey Muñoz MD   bisacodyl (BISACODYL) 5 MG EC tablet Take 4 tablets of Bisacodyl for colonoscopy prep as directed. 20   Betsey Muñoz MD       Current medications:    Current Facility-Administered Medications   Medication Dose Route Frequency Provider Last Rate Last Dose    lactated ringers infusion   Intravenous Once Betsey Muñoz MD           Allergies:  No Known Allergies    Problem List:    Patient Active Problem List   Diagnosis Code    Rectal bleeding K62.5    Diarrhea R19.7    Lower abdominal pain R10.30       Past Medical History:        Diagnosis Date    Asthma        Past Surgical History:  History reviewed. No pertinent surgical history.     Social History:    Social History     Tobacco Use    Smoking status: Current Every Day Smoker     Packs/day: 1.00     Types: Cigarettes    Smokeless tobacco: Never Used   Substance Use Topics    Alcohol use: Not Currently                                Ready to quit: Not Answered  Counseling given: Not Answered      Vital Signs (Current):   Vitals:    20 1419   Weight: 157 lb (71.2 kg)   Height: 5' 6\" (1.676 m)                                              BP Readings from Last 3 Encounters:   20 118/80   20 132/84   20 107/65       NPO Status: Time of last liquid consumption: 0630                        Time of last solid consumption: 0400                        Date of last liquid consumption: 09/10/20                        Date of last solid food consumption: 09/09/20    BMI:   Wt Readings from Last 3 Encounters:   09/03/20 157 lb (71.2 kg)   08/21/20 157 lb (71.2 kg)   08/20/20 165 lb (74.8 kg)     Body mass index is 25.34 kg/m². CBC:   Lab Results   Component Value Date    WBC 6.1 08/20/2020    RBC 4.97 08/20/2020    HGB 15.9 08/20/2020    HCT 46.7 08/20/2020    MCV 94.0 08/20/2020    RDW 13.0 08/20/2020     08/20/2020       CMP:   Lab Results   Component Value Date     08/20/2020    K 4.0 08/20/2020     08/20/2020    CO2 28 08/20/2020    BUN 13 08/20/2020    CREATININE 0.7 08/20/2020    GFRAA >60 08/20/2020    AGRATIO 2.4 08/20/2020    LABGLOM >60 08/20/2020    GLUCOSE 99 08/20/2020    PROT 6.8 08/20/2020    CALCIUM 9.2 08/20/2020    BILITOT 1.4 08/20/2020    ALKPHOS 64 08/20/2020    AST 17 08/20/2020    ALT 16 08/20/2020       POC Tests: No results for input(s): POCGLU, POCNA, POCK, POCCL, POCBUN, POCHEMO, POCHCT in the last 72 hours.     Coags: No results found for: PROTIME, INR, APTT    HCG (If Applicable): No results found for: PREGTESTUR, PREGSERUM, HCG, HCGQUANT     ABGs: No results found for: PHART, PO2ART, BRQ4VKT, JVY3DYQ, BEART, H6EPLEMG     Type & Screen (If Applicable):  No results found for: LABABO, LABRH    Drug/Infectious Status (If Applicable):  No results found for: HIV, HEPCAB    COVID-19 Screening (If Applicable):   Lab Results   Component Value Date    COVID19 NOT DETECTED 09/04/2020         Anesthesia Evaluation  Patient summary reviewed no history of anesthetic complications:   Airway: Mallampati: II  TM distance: >3 FB   Neck ROM: full  Mouth opening: > = 3 FB Dental: normal exam         Pulmonary:normal exam  breath sounds clear to auscultation  (+) asthma:                            Cardiovascular:Negative CV

## 2020-09-10 NOTE — OP NOTE
[address] ,  [address] [facility]  Phone: [phone]   [facility]:[phone]    Colonoscopy Procedure Note    Patient: Josy Johnson  : 1995    Procedure: Colonoscopy with biopsies    Date:  9/10/2020     Endoscopist:  Ed Gonzalez MD    Referring Physician:  No primary care provider on file. Preoperative Diagnosis:  Rectal bleeding, Diarrhea, Lower Abd Pain    Postoperative Diagnosis:  See impression    Anesthesia: Anesthesia: MAC  Sedation: see anesthesia notes for details. Start Time: 11:47  Stop Time: 12:09  Withdrawal time: 6 minutes 15 seconds  ASA Class: 2  Mallampati: II (soft palate, uvula, fauces visible)    Indications: This is a 30y.o. year old male who presents today with mild diarrhea and rectal bleeding, lower abdominal pain. A colonoscopy is indicated to rule out inflammatory bowel disease (Crohns disease or UC), proctitis or other colonic pathology.     Procedure Details  Informed consent was obtained for the procedure, including sedation. Risks of perforation, hemorrhage, adverse drug reaction and aspiration were discussed. The patient was placed in the left lateral decubitus position. Based on the pre-procedure assessment, including review of the patient's medical history, medications, allergies, and review of systems, he had been deemed to be an appropriate candidate for conscious sedation; he was therefore sedated with the medications listed below. The patient was monitored continuously with ECG tracing, pulse oximetry, blood pressure monitoring, and direct observations. rectal examination was performed. The colonoscope was inserted into the rectum and advanced under direct vision to the terminal ileum. The quality of the colonic preparation was good. A careful inspection was made as the colonoscope was withdrawn, including a retroflexed view of the rectum; findings and interventions are described below.   Appropriate photodocumentation was obtained. Patient tolerated the procedure well. Findings: -Small internal hemorrhoids. Otherwise normal colonoscopy to the cecum. Terminal ileum appears normal. Random colon biopsies were done to rule out microscopic colitis. - Anesthesia issues: no    Specimens: Was Obtained: Bottle A, random colon biopsies, rule out microscopic colitis    Complications:   None    Estimated blood loss: minimal    Disposition:   PACU - hemodynamically stable. Impression:   -Small internal hemorrhoids. Otherwise normal colonoscopy to the cecum. Terminal ileum appears normal. Random colon biopsies were done to rule out microscopic colitis. Recommendations:  -Await pathology.   - No major pathology on colonoscopy. Rectal bleeding could be hemorrhoidal.  - Take a fiber supplement such as Metamucil or Citrucel daily.  - Follow back in office 4 weeks.         Prince Leija 9/10/20 11:34 AM EDT

## 2021-03-22 ENCOUNTER — APPOINTMENT (OUTPATIENT)
Dept: CT IMAGING | Age: 26
End: 2021-03-22
Payer: COMMERCIAL

## 2021-03-22 ENCOUNTER — HOSPITAL ENCOUNTER (EMERGENCY)
Age: 26
Discharge: HOME | End: 2021-03-22
Attending: EMERGENCY MEDICINE
Payer: COMMERCIAL

## 2021-03-22 VITALS
BODY MASS INDEX: 25.71 KG/M2 | OXYGEN SATURATION: 100 % | TEMPERATURE: 97.5 F | SYSTOLIC BLOOD PRESSURE: 118 MMHG | HEART RATE: 78 BPM | WEIGHT: 160 LBS | DIASTOLIC BLOOD PRESSURE: 89 MMHG | HEIGHT: 66 IN | RESPIRATION RATE: 16 BRPM

## 2021-03-22 DIAGNOSIS — M53.3 SACRAL BACK PAIN: ICD-10-CM

## 2021-03-22 DIAGNOSIS — M79.605 LEFT LEG PAIN: ICD-10-CM

## 2021-03-22 DIAGNOSIS — R10.32 LEFT LOWER QUADRANT ABDOMINAL PAIN: Primary | ICD-10-CM

## 2021-03-22 LAB
A/G RATIO: 2.2 (ref 1.1–2.2)
ALBUMIN SERPL-MCNC: 4.6 G/DL (ref 3.4–5)
ALP BLD-CCNC: 66 U/L (ref 40–129)
ALT SERPL-CCNC: 12 U/L (ref 10–40)
AMPHETAMINE SCREEN, URINE: NORMAL
ANION GAP SERPL CALCULATED.3IONS-SCNC: 8 MMOL/L (ref 3–16)
AST SERPL-CCNC: 13 U/L (ref 15–37)
BARBITURATE SCREEN URINE: NORMAL
BASOPHILS ABSOLUTE: 0.1 K/UL (ref 0–0.2)
BASOPHILS RELATIVE PERCENT: 0.6 %
BENZODIAZEPINE SCREEN, URINE: NORMAL
BILIRUB SERPL-MCNC: 1.1 MG/DL (ref 0–1)
BILIRUBIN URINE: NEGATIVE
BLOOD, URINE: NEGATIVE
BUN BLDV-MCNC: 13 MG/DL (ref 7–20)
CALCIUM SERPL-MCNC: 9.4 MG/DL (ref 8.3–10.6)
CANNABINOID SCREEN URINE: NORMAL
CHLORIDE BLD-SCNC: 103 MMOL/L (ref 99–110)
CLARITY: CLEAR
CO2: 28 MMOL/L (ref 21–32)
COCAINE METABOLITE SCREEN URINE: NORMAL
COLOR: YELLOW
CREAT SERPL-MCNC: 0.7 MG/DL (ref 0.9–1.3)
EOSINOPHILS ABSOLUTE: 0.1 K/UL (ref 0–0.6)
EOSINOPHILS RELATIVE PERCENT: 1 %
GFR AFRICAN AMERICAN: >60
GFR NON-AFRICAN AMERICAN: >60
GLOBULIN: 2.1 G/DL
GLUCOSE BLD-MCNC: 103 MG/DL (ref 70–99)
GLUCOSE URINE: NEGATIVE MG/DL
HCT VFR BLD CALC: 43.5 % (ref 40.5–52.5)
HEMOGLOBIN: 15 G/DL (ref 13.5–17.5)
KETONES, URINE: NEGATIVE MG/DL
LEUKOCYTE ESTERASE, URINE: NEGATIVE
LIPASE: 26 U/L (ref 13–60)
LYMPHOCYTES ABSOLUTE: 1.9 K/UL (ref 1–5.1)
LYMPHOCYTES RELATIVE PERCENT: 21.2 %
Lab: NORMAL
MCH RBC QN AUTO: 32.5 PG (ref 26–34)
MCHC RBC AUTO-ENTMCNC: 34.5 G/DL (ref 31–36)
MCV RBC AUTO: 94.3 FL (ref 80–100)
METHADONE SCREEN, URINE: NORMAL
MICROSCOPIC EXAMINATION: NORMAL
MONOCYTES ABSOLUTE: 0.7 K/UL (ref 0–1.3)
MONOCYTES RELATIVE PERCENT: 7.3 %
NEUTROPHILS ABSOLUTE: 6.3 K/UL (ref 1.7–7.7)
NEUTROPHILS RELATIVE PERCENT: 69.9 %
NITRITE, URINE: NEGATIVE
OPIATE SCREEN URINE: NORMAL
OXYCODONE URINE: NORMAL
PDW BLD-RTO: 12.9 % (ref 12.4–15.4)
PH UA: 7.5
PH UA: 7.5 (ref 5–8)
PHENCYCLIDINE SCREEN URINE: NORMAL
PLATELET # BLD: 181 K/UL (ref 135–450)
PMV BLD AUTO: 9 FL (ref 5–10.5)
POTASSIUM REFLEX MAGNESIUM: 4.1 MMOL/L (ref 3.5–5.1)
PROPOXYPHENE SCREEN: NORMAL
PROTEIN UA: NEGATIVE MG/DL
RBC # BLD: 4.62 M/UL (ref 4.2–5.9)
SODIUM BLD-SCNC: 139 MMOL/L (ref 136–145)
SPECIFIC GRAVITY UA: 1.02 (ref 1–1.03)
TOTAL PROTEIN: 6.7 G/DL (ref 6.4–8.2)
URINE TYPE: NORMAL
UROBILINOGEN, URINE: 0.2 E.U./DL
WBC # BLD: 9 K/UL (ref 4–11)

## 2021-03-22 PROCEDURE — 80053 COMPREHEN METABOLIC PANEL: CPT

## 2021-03-22 PROCEDURE — 81003 URINALYSIS AUTO W/O SCOPE: CPT

## 2021-03-22 PROCEDURE — 74177 CT ABD & PELVIS W/CONTRAST: CPT

## 2021-03-22 PROCEDURE — 83690 ASSAY OF LIPASE: CPT

## 2021-03-22 PROCEDURE — 2580000003 HC RX 258: Performed by: EMERGENCY MEDICINE

## 2021-03-22 PROCEDURE — 85025 COMPLETE CBC W/AUTO DIFF WBC: CPT

## 2021-03-22 PROCEDURE — 99283 EMERGENCY DEPT VISIT LOW MDM: CPT

## 2021-03-22 PROCEDURE — 80307 DRUG TEST PRSMV CHEM ANLYZR: CPT

## 2021-03-22 PROCEDURE — 96374 THER/PROPH/DIAG INJ IV PUSH: CPT

## 2021-03-22 PROCEDURE — 6360000004 HC RX CONTRAST MEDICATION: Performed by: EMERGENCY MEDICINE

## 2021-03-22 PROCEDURE — 6360000002 HC RX W HCPCS: Performed by: EMERGENCY MEDICINE

## 2021-03-22 RX ORDER — 0.9 % SODIUM CHLORIDE 0.9 %
1000 INTRAVENOUS SOLUTION INTRAVENOUS ONCE
Status: COMPLETED | OUTPATIENT
Start: 2021-03-22 | End: 2021-03-22

## 2021-03-22 RX ORDER — ONDANSETRON 2 MG/ML
4 INJECTION INTRAMUSCULAR; INTRAVENOUS EVERY 30 MIN PRN
Status: DISCONTINUED | OUTPATIENT
Start: 2021-03-22 | End: 2021-03-22 | Stop reason: HOSPADM

## 2021-03-22 RX ORDER — LIDOCAINE 50 MG/G
1 PATCH TOPICAL EVERY 24 HOURS
Qty: 30 PATCH | Refills: 0 | Status: SHIPPED | OUTPATIENT
Start: 2021-03-22 | End: 2021-04-21

## 2021-03-22 RX ORDER — NAPROXEN 500 MG/1
500 TABLET ORAL 2 TIMES DAILY PRN
Qty: 20 TABLET | Refills: 0 | OUTPATIENT
Start: 2021-03-22 | End: 2021-11-11

## 2021-03-22 RX ORDER — KETOROLAC TROMETHAMINE 30 MG/ML
30 INJECTION, SOLUTION INTRAMUSCULAR; INTRAVENOUS ONCE
Status: DISCONTINUED | OUTPATIENT
Start: 2021-03-22 | End: 2021-03-22 | Stop reason: HOSPADM

## 2021-03-22 RX ADMIN — IOPAMIDOL 75 ML: 755 INJECTION, SOLUTION INTRAVENOUS at 13:39

## 2021-03-22 RX ADMIN — ONDANSETRON HYDROCHLORIDE 4 MG: 2 INJECTION, SOLUTION INTRAMUSCULAR; INTRAVENOUS at 13:09

## 2021-03-22 RX ADMIN — SODIUM CHLORIDE 1000 ML: 9 INJECTION, SOLUTION INTRAVENOUS at 13:10

## 2021-03-22 ASSESSMENT — PAIN SCALES - GENERAL: PAINLEVEL_OUTOF10: 7

## 2021-03-22 ASSESSMENT — PAIN DESCRIPTION - FREQUENCY: FREQUENCY: CONTINUOUS

## 2021-03-22 NOTE — ED NOTES
Bed: 06  Expected date:   Expected time:   Means of arrival:   Comments:     Charlie Srinivasan  03/22/21 1228

## 2021-03-22 NOTE — ED PROVIDER NOTES
Emergency Physician Note  [address] 11 [facility] ED  [address] [facility] [place]. [address]  Dept: [phone]  Loc: [phone]  Open Note Time:  12:30 PM EDT    Chief Complaint  Abdominal Pain (Mid ABD pain onset today, Nausea with emesis, back pain radiates down left leg, denies injury, seen @ Urgent Care 3/16/21 given muscle relaxer and anti inflammatory w/o relief. )       History of Present Illness  Ashlyn Ling is a 30 y.o. male  has a past medical history of Asthma. who presents to the ED for nominal pain and back pain. Patient has intermittent back pain for which he has been evaluated for in the past.  He started having left lower back pain that radiates down his left leg on Saturday night. Urgent care gave him some anti-inflammatories as well as muscle relaxer he states it did help but today the pain got significantly worse. He is also stating today he started having left-sided mid abdominal pain that he describes as sharp. He reports nausea and vomiting. Denies any recent injury to his lower back. He reports that he had an episode of diarrhea today as well. He states that intermittently he will get bloody diarrhea however he has been worked up for this in the past and he is already had a colonoscopy in regards to the bloody diarrhea. Currently does not have any bloody diarrhea at this time. No Saddle Anesthesia,  no Bowel Dysfunction, no Bladder Dysfunction, no Motor Deficits, no Sensory Deficits. Denies fever/IVDU. Denies fever,  chest pain, shortness of breath, cough,  diarrhea, headache, sore throat, dysuria, back pain, rash. No palliative/provocative factors.        Unless otherwise stated in this report or unable to obtain because of the patient's clinical or mental status as evidenced by the medical record, this patient's positive and negative responses for review of systems, constitutional, psych, eyes, ENT, cardiovascular, respiratory, gastrointestinal, neurological, genitourinary, musculoskeletal, integument systems and systems related to the presenting problem are either stated in the preceding paragraph or were not pertinent or were negative for the symptoms and/or complaints related to the medical problem. I have reviewed the following from the nursing documentation:      Prior to Admission medications    Medication Sig Start Date End Date Taking? Authorizing Provider   colestipol (COLESTID) 1 g tablet Take 1 tablet by mouth 2 times daily 9/10/20   Virgen Dveine MD   calcium carbonate (TUMS) 500 MG chewable tablet Take 1 tablet by mouth daily    Historical Provider, MD   dicyclomine (BENTYL) 10 MG capsule Take 1 capsule by mouth every 8 hours as needed (abdominal pain) 8/21/20   Virgen Devine MD       Allergies as of 03/22/2021    (No Known Allergies)       Past Medical History:   Diagnosis Date    Asthma         Surgical History:   Past Surgical History:   Procedure Laterality Date    COLONOSCOPY N/A 9/10/2020    COLONOSCOPY BIOPSY performed by Virgen Devine MD at [facility]      adnoids removed    TYMPANOSTOMY TUBE PLACEMENT          Family History:  History reviewed. No pertinent family history.     Social History     Socioeconomic History    Marital status: Single     Spouse name: Not on file    Number of children: Not on file    Years of education: Not on file    Highest education level: Not on file   Occupational History    Not on file   Social Needs    Financial resource strain: Not on file    Food insecurity     Worry: Not on file     Inability: Not on file    Transportation needs     Medical: Not on file     Non-medical: Not on file   Tobacco Use    Smoking status: Current Every Day Smoker     Packs/day: 1.00     Types: Cigarettes    Smokeless tobacco: Never Used   Substance and Sexual Activity    Alcohol use: Not Currently    Drug use: No    Sexual activity: Yes     Partners: Female   Lifestyle    Physical activity     Days per week: Not on file     Minutes per session: Not on file    Stress: Not on file   Relationships    Social connections     Talks on phone: Not on file     Gets together: Not on file     Attends Yazdanism service: Not on file     Active member of club or organization: Not on file     Attends meetings of clubs or organizations: Not on file     Relationship status: Not on file    Intimate partner violence     Fear of current or ex partner: Not on file     Emotionally abused: Not on file     Physically abused: Not on file     Forced sexual activity: Not on file   Other Topics Concern    Not on file   Social History Narrative    Not on file       Nursing notes reviewed. ED Triage Vitals [03/22/21 1121]   Enc Vitals Group      /77      Pulse 96      Resp 16      Temp 97.5 °F (36.4 °C)      Temp Source Oral      SpO2 100 %      Weight 160 lb (72.6 kg)      Height 5' 6\" (1.676 m)      Head Circumference       Peak Flow       Pain Score       Pain Loc       Pain Edu? Excl. in [address]? GENERAL:   Body mass index is 25.82 kg/m². Awake, alert. Well developed, well nourished with no apparent distress. Nontoxic-appearing, non-ill-appearing. HENT:   Normocephalic, Atraumatic, no lacerations. No ENT exam due to PPE. EYES:   Conjunctiva normal,   Pupils equal round and reactive to light,   Extraocular movements normal.  NECK:  Trachea is midline. No stridor. CHEST:  Regular rate and regular rhythm, no murmurs/rubs/gallops,  normal S1/S2, chest wall non-tender. LUNGS:  No respiratory distress. No abdominal retractions, no sternal retractions  Clear to auscultation bilaterally, no wheezing, no rhochi, no rales  Speaking comfortably in full sentences  ABDOMEN:  Soft, moderate left lower quadrant abdominal tenderness to palpation, non-distended. No guarding. No rebound.   No costovertebral angle tenderness to palpation. Normal BS, no organomegaly, no abdominal masses  EXTREMITIES:  Moves extremities x4 with purpose. Normal range of motion, no edema,  No tenderness, no deformity,  distal pulses present and equal bilaterally. BACK:  No midline tenderness in the cervical, thoracic, and lumbar spine. However he had very focal midline tenderness in the sacral region, the area was not indurated not erythematous and there is no fluctuance. No deformities, no step-off. Palpation did not elicit any paraspinous tenderness. SKIN:  Warm, dry and intact. NEUROLOGIC:  Normal mental status. Moving all extremities to command. Alert and oriented x4  without focal motor deficit or gross sensory deficit. Normal speech. Strength 5/5 in bilateral upper and lower extremities. Sensation is intact in the upper and lower extremities. +2 Patellar Reflexes Bilaterally, +2 Achilles Reflexes Bilaterally. Light touch in lower extremities bilaterally is intact. No overlying rashes. LE strength is 5/5. Straight leg test is not present on the bilateral.  PSYCHIATRIC:  Not anxious,  normal mood and affect,  Appropriate eye contact,  thoughts are linear and organized,  without delusions/hallucinations,  Not responding to internal stimuli,  responds appropriately to questions    LABS and DIAGNOSTIC RESULTS    RADIOLOGY  X-RAYS:  I have reviewed radiologic plain film image(s). ALL OTHER NON-PLAIN FILM IMAGES SUCH AS CT, ULTRASOUND AND MRI HAVE BEEN READ BY THE RADIOLOGIST. CT ABDOMEN PELVIS W IV CONTRAST Additional Contrast? None   Final Result   1. No acute abnormality.               LABS  Results for orders placed or performed during the hospital encounter of 03/22/21   CBC Auto Differential   Result Value Ref Range    WBC 9.0 4.0 - 11.0 K/uL    RBC 4.62 4.20 - 5.90 M/uL    Hemoglobin 15.0 13.5 - 17.5 g/dL    Hematocrit 43.5 40.5 - 52.5 %    MCV 94.3 80.0 - 100.0 fL    MCH 32.5 26.0 - 34.0 pg    MCHC 34.5 31.0 - 36.0 g/dL    RDW 12.9 12.4 - 15.4 %    Platelets 718 391 - 529 K/uL    MPV 9.0 5.0 - 10.5 fL    Neutrophils % 69.9 %    Lymphocytes % 21.2 %    Monocytes % 7.3 %    Eosinophils % 1.0 %    Basophils % 0.6 %    Neutrophils Absolute 6.3 1.7 - 7.7 K/uL    Lymphocytes Absolute 1.9 1.0 - 5.1 K/uL    Monocytes Absolute 0.7 0.0 - 1.3 K/uL    Eosinophils Absolute 0.1 0.0 - 0.6 K/uL    Basophils Absolute 0.1 0.0 - 0.2 K/uL   Comprehensive Metabolic Panel w/ Reflex to MG   Result Value Ref Range    Sodium 139 136 - 145 mmol/L    Potassium reflex Magnesium 4.1 3.5 - 5.1 mmol/L    Chloride 103 99 - 110 mmol/L    CO2 28 21 - 32 mmol/L    Anion Gap 8 3 - 16    Glucose 103 (H) 70 - 99 mg/dL    BUN 13 7 - 20 mg/dL    CREATININE 0.7 (L) 0.9 - 1.3 mg/dL    GFR Non-African American >60 >60    GFR African American >60 >60    Calcium 9.4 8.3 - 10.6 mg/dL    Total Protein 6.7 6.4 - 8.2 g/dL    Albumin 4.6 3.4 - 5.0 g/dL    Albumin/Globulin Ratio 2.2 1.1 - 2.2    Total Bilirubin 1.1 (H) 0.0 - 1.0 mg/dL    Alkaline Phosphatase 66 40 - 129 U/L    ALT 12 10 - 40 U/L    AST 13 (L) 15 - 37 U/L    Globulin 2.1 g/dL   Urinalysis, reflex to microscopic   Result Value Ref Range    Color, UA Yellow Straw/Yellow    Clarity, UA Clear Clear    Glucose, Ur Negative Negative mg/dL    Bilirubin Urine Negative Negative    Ketones, Urine Negative Negative mg/dL    Specific Gravity, UA 1.020 1.005 - 1.030    Blood, Urine Negative Negative    pH, UA 7.5 5.0 - 8.0    Protein, UA Negative Negative mg/dL    Urobilinogen, Urine 0.2 <2.0 E.U./dL    Nitrite, Urine Negative Negative    Leukocyte Esterase, Urine Negative Negative    Microscopic Examination Not Indicated     Urine Type NotGiven    Drug screen multi urine   Result Value Ref Range    Amphetamine Screen, Urine Neg Negative <1000ng/mL    Barbiturate Screen, Ur Neg Negative <200 ng/mL    Benzodiazepine Screen, Urine Neg Negative <200 ng/mL    Cannabinoid Scrn, Ur Neg Negative <50 ng/mL    Cocaine Metabolite Screen, Urine Neg Negative <300 ng/mL    Opiate Scrn, Ur Neg Negative <300 ng/mL    PCP Screen, Urine Neg Negative <25 ng/mL    Methadone Screen, Urine Neg Negative <300 ng/mL    Propoxyphene Scrn, Ur Neg Negative <300 ng/mL    Oxycodone Urine Neg Negative <100 ng/ml    pH, UA 7.5     Drug Screen Comment: see below    Lipase   Result Value Ref Range    Lipase 26.0 13.0 - 60.0 U/L       SCREENINGS  NIH Score     [facility]     [facility]    Heart Score       PROCEDURES    MEDICAL DECISION MAKING    Procedures/interventions/images ordered for this visit  Orders Placed This Encounter   Procedures    CBC Auto Differential    Comprehensive Metabolic Panel w/ Reflex to MG    Urinalysis, reflex to microscopic    Drug screen multi urine    Saline lock IV       Medications ordered for this visit  Orders Placed This Encounter   Medications    0.9 % sodium chloride bolus    ketorolac (TORADOL) injection 30 mg    ondansetron (ZOFRAN) injection 4 mg       ED course notes for this visit  ED Course as of Mar 22 1423   Mon Mar 22, 2021   1421 Patient reports that his abdominal pain is completely resolved after treatment with medications. We discussed the results of the CT scan. I encouraged him to continue taking the muscle relaxer as that appears to be the only thing that is helping his pain. I also discussed the possibility this could be a developing pilonidal abscess and that he should watch that area for any signs of infection.    [SG]      ED Course User Index  [SG] Diane Velez MD       I wore N95 Envo mask with filter protection, facial shield and gloves when I evaluated the patient.     I evaluated the patient in room 06/06    Differential diagnosis: Abdominal Aortic Aneurysm, Acute Coronary Syndrome, Ischemic Bowel, Bowel Obstruction (including Gastric Outlet Obstruction), PUD, GERD, Acute Cholecystitis, Pancreatitis, Hepatitis, Colitis, SMA Syndrome, Mesenteric Steal Syndrome, Splanchnic Vein Thrombosis, Appendicitis, Diverticulitis, Pyelonephritis, UTI, STD, Gonad Torsion, Ureterolithiasis      This is a very pleasant patient presents with abdominal pain of unclear etiology. At the time of discharge, patient is without significant evidence of toxicity, shock, sepsis, hemodynamic or cardiopulmonary instability, acute appendicitis, acute cholecystitis, AAA, bowel obstruction, bowel perforation, herpes zoster, a cardiac or pulmonary etiology as a cause for the abdominal symptoms, or any disease process requiring other immediate surgical or medical intervention at this time. A CT scan was performed to evaluate for potential causes of the abdominal pain, however, neither the clinical exam nor the CT has identified an emergent etiology for the abdominal pain. Based on the physical exam, the laboratory studies, and the CT findings, I have a low suspicion for a surgical emergency or any other life-threatening disease process at this time. I have discussed with the patient the level of uncertainty with undifferentiated abdominal pain and that it may be too early in the disease process to make a definitive diagnosis of all causes of serious causes of abdominal pain. After treatment in the ER, patient had improvement of their symptoms. On repeat physical exam, patient has a benign abdominal exam.  Pain management and follow-up plan were discussed with the patient. I have clearly explained the need to follow-up as noted on the discharge instructions. The patient understands that all disease processes change over time and therefore close follow up and serial exams are mandatory should the symptoms not resolve completely. The patient understands to return to the Emergency Department immediately if the pain worsens, develops fever, persistent and uncontrollable vomiting, or for any new symptoms or concerns. Otherwise, they will follow the discharge instructions to arrange close follow up for a re-evaluation.     High Sensitivity Neuro Exam (HSNE) Spine  If Lumbar Pain (also check femoral pulses):  L1-L2 Cremasteric Reflex (inner thigh sensation): Present  L2 Adduct Thigh (cross legs): Present  L3 Extend Knee: Present  L4 Dorsiflex Ankle (Up): Present  L5 Point Great Toe Up: Present  L2 L3-L4 Knee Reflex: Present  S1 Flex Knee: Present  S2 Plantarflex Toes: Present  S3, 4, 5 Groin/Perianal Sensation: Present    Red Flags  Minor (1 Point Each)  Alcohol Abuse: +0 No  Diabetes Mellitus: +0 No  Renal Failure: +0 No  Night Pain: +0 No  3rd visit in last 20 days: +0 No    Major (3 Points Each)  IV Drug Use: +0 No  Fever without focus: +0 No  Recent/Current Systemic Infection: +0 No  Immunosuppression (can include chemotherapy, advanced cancer, severe malnutrition, chronic immunosuppressive drugs): +0 No  Recent Spinal Fracture/Procedure (if patient is also on anticoagulation [warfarin, heparin, and NOAC] strongly consider MRI): +0 No  Incontinence or retention: +0 No  Indwelling urinary catheter: +0 No    Red flag score: 0    Patient presents to ED for evaluation of back pain. No history of direct trauma. Neurovascular exam in the ER is unremarkable for any deficits. Vitals signs have been reviewed and are stable. They are afebrile and nontoxic appearing, but in moderate distress due to pain. Pain was addressed with pain medication. HSNE Spine was without abnormal findings and Red Flag Score evaluated. Red flag score was less than or equal to 3 therefore ESR was not ordered. Further imaging was not ordered because red flag score was less than or equal to 3..      I completed a structured, evidence-based clinical evaluation to screen for acute non-traumatic spinal emergencies. The patient has a normal detailed neurologic exam and a low red flag score. The evidence indicates that the patient is very low risk for an acute spinal emergency and this is consistent with my clinical intuition.  The risk of further workup or hospitalization is likely higher than the likelihood of the patient having a spinal epidural abscess or other dangerous emergency spinal condition It is, therefore, in the patients best interest not to do additional emergent testing. I have discussed with the patient my clinical impression and the result of an evidence-based clinical evaluation to screen for spinal epidural abscess and other spinal emergencies, as well as the risk of further testing and hospitalization. The evidence shows that the risk for an acute spinal emergency is less than 1%. Although the risk of an acute spinal emergency has not been eliminated, the risks of further testing likely exceed the benefit, and the patient declines further emergent evaluation or hospitalization for dangerous emergency spinal conditions. On reevaluation patient is feeling improved. Based on the history and exam, there is no significant evidence of fracture, spinal cord compression, central disc herniation, cauda equina syndrome, osteomyelitis, epidural abscess, epidural hematoma, other focal infection, mass, tumor, unstable spinal column, or other process requiring immediate medical or surgical intervention at this time. Based on the history, exam, and ED work-up, it appears the patients symptoms are due to a muscle spasm. At this time I feel they are stable for d/c home with pain has been controlled, and their v/s are stable. The diagnosis, expected course, discharge medications, and follow-up plan (with their PMD) were discussed. It is understood that if they are not improving as expected or if other new symptoms or signs of concern develop, other etiologies or diagnoses may need to be considered requiring other tests, treatments, consultations, and/or admission. Return precautions were discussed and all questions were answered. Final Impression    1. Left lower quadrant abdominal pain    2. Sacral back pain    3.  Left leg pain        Blood pressure 115/77, pulse 96, temperature AVS for further details regarding discharge instructions. Pt is in stable condition upon Discharge to home. The note was completed using Dragon voice recognition transcription. Every effort was made to ensure accuracy; however, inadvertent transcription errors may be present despite my best efforts to edit errors.     Akiko Richardson MD  [address] [place] [address]        Akiko Richardson MD  03/22/21 [id number]

## 2021-11-11 ENCOUNTER — HOSPITAL ENCOUNTER (EMERGENCY)
Age: 26
Discharge: HOME | End: 2021-11-11
Payer: COMMERCIAL

## 2021-11-11 VITALS
SYSTOLIC BLOOD PRESSURE: 120 MMHG | TEMPERATURE: 98.3 F | BODY MASS INDEX: 25.71 KG/M2 | HEIGHT: 66 IN | WEIGHT: 160 LBS | DIASTOLIC BLOOD PRESSURE: 78 MMHG | HEART RATE: 70 BPM | RESPIRATION RATE: 18 BRPM | OXYGEN SATURATION: 99 %

## 2021-11-11 DIAGNOSIS — K08.89 ODONTALGIA: Primary | ICD-10-CM

## 2021-11-11 DIAGNOSIS — K02.9 DENTAL CARIES: ICD-10-CM

## 2021-11-11 PROCEDURE — 6370000000 HC RX 637 (ALT 250 FOR IP): Performed by: PHYSICIAN ASSISTANT

## 2021-11-11 PROCEDURE — 99284 EMERGENCY DEPT VISIT MOD MDM: CPT

## 2021-11-11 RX ORDER — PENICILLIN V POTASSIUM 500 MG/1
500 TABLET ORAL 4 TIMES DAILY
Qty: 40 TABLET | Refills: 0 | Status: SHIPPED | OUTPATIENT
Start: 2021-11-11 | End: 2021-11-21

## 2021-11-11 RX ORDER — PENICILLIN V POTASSIUM 250 MG/1
500 TABLET ORAL ONCE
Status: COMPLETED | OUTPATIENT
Start: 2021-11-11 | End: 2021-11-11

## 2021-11-11 RX ORDER — ACETAMINOPHEN 325 MG/1
650 TABLET ORAL ONCE
Status: COMPLETED | OUTPATIENT
Start: 2021-11-11 | End: 2021-11-11

## 2021-11-11 RX ORDER — IBUPROFEN 600 MG/1
600 TABLET ORAL EVERY 6 HOURS PRN
Qty: 20 TABLET | Refills: 0 | Status: SHIPPED | OUTPATIENT
Start: 2021-11-11

## 2021-11-11 RX ORDER — IBUPROFEN 600 MG/1
600 TABLET ORAL ONCE
Status: COMPLETED | OUTPATIENT
Start: 2021-11-11 | End: 2021-11-11

## 2021-11-11 RX ADMIN — PENICILLIN V POTASSIUM 500 MG: 250 TABLET, FILM COATED ORAL at 22:03

## 2021-11-11 RX ADMIN — ACETAMINOPHEN 650 MG: 325 TABLET ORAL at 22:04

## 2021-11-11 RX ADMIN — IBUPROFEN 600 MG: 600 TABLET ORAL at 22:04

## 2021-11-11 ASSESSMENT — PAIN SCALES - GENERAL
PAINLEVEL_OUTOF10: 10
PAINLEVEL_OUTOF10: 6

## 2021-11-11 ASSESSMENT — PAIN DESCRIPTION - PAIN TYPE: TYPE: ACUTE PAIN

## 2021-11-12 ASSESSMENT — ENCOUNTER SYMPTOMS
TROUBLE SWALLOWING: 0
VOMITING: 0
TRISMUS: 0
SHORTNESS OF BREATH: 0

## 2021-11-12 NOTE — ED PROVIDER NOTES
HISTORY      adnoids removed    TYMPANOSTOMY [address] [place]       Discharge Medication List as of 11/11/2021 10:00 PM      CONTINUE these medications which have NOT CHANGED    Details   cyclobenzaprine (FLEXERIL) 10 MG tablet TAKE 1 TABLET BY MOUTH THREE TIMES DAILY AS NEEDED FOR MUSCLE SPASMHistorical Med      diclofenac (VOLTAREN) 75 MG EC tablet TAKE 1 TABLET BY MOUTH ONCE DAILY WITH FOODHistorical Med      COVID-19 Specimen Collection KIT TEST AS DIRECTEDHistorical Med      colestipol (COLESTID) 1 g tablet Take 1 tablet by mouth 2 times daily, Disp-60 tablet,R-3Normal      calcium carbonate (TUMS) 500 MG chewable tablet Take 1 tablet by mouth dailyHistorical Med      dicyclomine (BENTYL) 10 MG capsule Take 1 capsule by mouth every 8 hours as needed (abdominal pain), Disp-40 capsule,R-0Normal               ALLERGIES     Patient has no known allergies. FAMILYHISTORY     History reviewed. No pertinent family history. SOCIAL HISTORY       Social History     Socioeconomic History    Marital status:      Spouse name: None    Number of children: None    Years of education: None    Highest education level: None   Occupational History    None   Tobacco Use    Smoking status: Current Every Day Smoker     Packs/day: 1.00     Types: Cigarettes    Smokeless tobacco: Never Used   Vaping Use    Vaping Use: Never used   Substance and Sexual Activity    Alcohol use: Not Currently    Drug use: No    Sexual activity: Yes     Partners: Female   Other Topics Concern    None   Social History Narrative    None     Social Determinants of Health     Financial Resource Strain:     Difficulty of Paying Living Expenses: Not on file   Food Insecurity:     Worried About Running Out of Food in the Last Year: Not on file    Javad of Food in the Last Year: Not on file   Transportation Needs:     Lack of Transportation (Medical):  Not on file    Lack of Transportation (Non-Medical): Not on file   Physical Activity:     Days of Exercise per Week: Not on file    Minutes of Exercise per Session: Not on file   Stress:     Feeling of Stress : Not on file   Social Connections:     Frequency of Communication with Friends and Family: Not on file    Frequency of Social Gatherings with Friends and Family: Not on file    Attends [employer] Services: Not on file    Active Member of [address] [place] [address] or Organizations: Not on file    Attends Club or Organization Meetings: Not on file    Marital Status: Not on file   Intimate Partner Violence:     Fear of Current or Ex-Partner: Not on file    Emotionally Abused: Not on file    Physically Abused: Not on file    Sexually Abused: Not on file   Housing Stability:     Unable to Pay for Housing in the Last Year: Not on file    Number of [place] in the Last Year: Not on file    Unstable Housing in the Last Year: Not on file       SCREENINGS             PHYSICAL EXAM    (up to 7 for level 4, 8 or more for level 5)     ED Triage Vitals [11/11/21 2028]   BP Temp Temp Source Pulse Resp SpO2 Height Weight   131/83 98.3 °F (36.8 °C) Temporal 68 18 98 % 5' 6\" (1.676 m) 160 lb (72.6 kg)       Physical Exam  Vitals and nursing note reviewed. Constitutional:       Appearance: He is well-developed. He is not ill-appearing or toxic-appearing. HENT:      Head: Normocephalic and atraumatic. Mouth/Throat:      Mouth: Mucous membranes are moist.      Dentition: Dental tenderness and dental caries present. Pharynx: Oropharynx is clear. Uvula midline. No pharyngeal swelling, oropharyngeal exudate, posterior oropharyngeal erythema or uvula swelling. Eyes:      Conjunctiva/sclera: Conjunctivae normal.   Cardiovascular:      Rate and Rhythm: Normal rate and regular rhythm. Heart sounds: Normal heart sounds. Pulmonary:      Effort: Pulmonary effort is normal. No respiratory distress. Breath sounds: Normal breath sounds. No stridor.  No wheezing, rhonchi or rales. Musculoskeletal:      Cervical back: Normal range of motion and neck supple. No rigidity. Skin:     General: Skin is warm and dry. Neurological:      Mental Status: He is alert and oriented to person, place, and time. Motor: No abnormal muscle tone. Psychiatric:         Behavior: Behavior normal.         DIAGNOSTIC RESULTS   LABS:    Labs Reviewed - No data to display    All other labs were within normal range or not returned as of this dictation. EKG: All EKG's are interpreted by the Emergency Department Physician in the absence of a cardiologist.  Please see their note for interpretation of EKG. RADIOLOGY:   Non-plain film images such as CT, Ultrasound and MRI are read by the radiologist. Plain radiographic images are visualized andpreliminarily interpreted by the  ED Provider with the below findings:        Interpretation perthe Radiologist below, if available at the time of this note:    No orders to display     No results found. PROCEDURES   Unless otherwise noted below, none     Procedures    CRITICAL CARE TIME   N/A    CONSULTS:  None      EMERGENCY DEPARTMENT COURSE and DIFFERENTIAL DIAGNOSIS/MDM:   Vitals:    Vitals:    11/11/21 2028 11/11/21 2207   BP: 131/83 120/78   Pulse: 68 70   Resp: 18 18   Temp: 98.3 °F (36.8 °C)    TempSrc: Temporal    SpO2: 98% 99%   Weight: 160 lb (72.6 kg)    Height: 5' 6\" (1.676 m)        Patient was given thefollowing medications:  Medications   penicillin v potassium (VEETID) tablet 500 mg (500 mg Oral Given 11/11/21 2203)   ibuprofen (ADVIL;MOTRIN) tablet 600 mg (600 mg Oral Given 11/11/21 2204)   acetaminophen (TYLENOL) tablet 650 mg (650 mg Oral Given 11/11/21 2204)         ED course  Patient presented to the ER for evaluation of dental pain for the past several days. On exam he has tender right upper third molar fractured tooth dental caries. No signs of ludwigs or airway compromise.  He is placed on antibiotics and ibuprofen for pain and advised to see dentist soon as possible. Dental clinic list. Return for worsening. I estimate there is LOW risk for a DEEP SPACE INFECTION (e.g., BEATRIZS ANGINA OR RETROPHARYNGEAL ABSCESS), MENINGITIS, or AIRWAY COMPROMISE, thus I consider the discharge disposition reasonable. Also, there is no evidence or peritonitis, sepsis, or toxicity. FINAL IMPRESSION      1. Odontalgia    2.  Dental caries          DISPOSITION/PLAN   DISPOSITION Decision to Discharge    PATIENT REFERREDTO:  Dentist    Schedule an appointment as soon as possible for a visit       [facility]  [address] [facility]  [phone]    If symptoms worsen      DISCHARGE MEDICATIONS:  Discharge Medication List as of 11/11/2021 10:00 PM      START taking these medications    Details   ibuprofen (IBU) 600 MG tablet Take 1 tablet by mouth every 6 hours as needed for Pain, Disp-20 tablet, R-0Normal      benzocaine (ORAJEL) 20 % GEL mucosal gel Take 1 Squirt by mouth 2 times daily as needed for Pain, Disp-9 g, R-0Normal      penicillin v potassium (VEETID) 500 MG tablet Take 1 tablet by mouth 4 times daily for 10 days, Disp-40 tablet, R-0Normal             DISCONTINUED MEDICATIONS:  Discharge Medication List as of 11/11/2021 10:00 PM      STOP taking these medications       naproxen (NAPROSYN) 500 MG tablet Comments:   Reason for Stopping:                      (Please note that portions ofthis note were completed with a voice recognition program.  Efforts were made to edit the dictations but occasionally words are mis-transcribed.)    Adria Elizabeth PA-C (electronically signed)            Dmitry Hood PA-C  11/12/21 0157

## 2023-01-30 ENCOUNTER — HOSPITAL ENCOUNTER (EMERGENCY)
Age: 28
Discharge: HOME | End: 2023-01-30
Payer: COMMERCIAL

## 2023-01-30 ENCOUNTER — APPOINTMENT (OUTPATIENT)
Dept: GENERAL RADIOLOGY | Age: 28
End: 2023-01-30
Payer: COMMERCIAL

## 2023-01-30 VITALS
SYSTOLIC BLOOD PRESSURE: 140 MMHG | OXYGEN SATURATION: 99 % | TEMPERATURE: 101.5 F | WEIGHT: 191 LBS | BODY MASS INDEX: 30.7 KG/M2 | RESPIRATION RATE: 18 BRPM | HEART RATE: 98 BPM | DIASTOLIC BLOOD PRESSURE: 77 MMHG | HEIGHT: 66 IN

## 2023-01-30 DIAGNOSIS — U07.1 COVID: Primary | ICD-10-CM

## 2023-01-30 LAB
A/G RATIO: 2 (ref 1.1–2.2)
ALBUMIN SERPL-MCNC: 4.6 G/DL (ref 3.4–5)
ALP BLD-CCNC: 61 U/L (ref 40–129)
ALT SERPL-CCNC: 32 U/L (ref 10–40)
ANION GAP SERPL CALCULATED.3IONS-SCNC: 10 MMOL/L (ref 3–16)
AST SERPL-CCNC: 26 U/L (ref 15–37)
BASOPHILS ABSOLUTE: 0 K/UL (ref 0–0.2)
BASOPHILS RELATIVE PERCENT: 0.5 %
BILIRUB SERPL-MCNC: 0.7 MG/DL (ref 0–1)
BILIRUBIN URINE: NEGATIVE
BLOOD, URINE: NEGATIVE
BUN BLDV-MCNC: 11 MG/DL (ref 7–20)
CALCIUM SERPL-MCNC: 8.3 MG/DL (ref 8.3–10.6)
CHLORIDE BLD-SCNC: 100 MMOL/L (ref 99–110)
CLARITY: CLEAR
CO2: 25 MMOL/L (ref 21–32)
COLOR: YELLOW
CREAT SERPL-MCNC: 0.9 MG/DL (ref 0.9–1.3)
EOSINOPHILS ABSOLUTE: 0 K/UL (ref 0–0.6)
EOSINOPHILS RELATIVE PERCENT: 0.6 %
GFR SERPL CREATININE-BSD FRML MDRD: >60 ML/MIN/{1.73_M2}
GLUCOSE BLD-MCNC: 124 MG/DL (ref 70–99)
GLUCOSE URINE: NEGATIVE MG/DL
HCT VFR BLD CALC: 42.4 % (ref 40.5–52.5)
HEMOGLOBIN: 14.5 G/DL (ref 13.5–17.5)
INFLUENZA A: NOT DETECTED
INFLUENZA B: NOT DETECTED
KETONES, URINE: NEGATIVE MG/DL
LEUKOCYTE ESTERASE, URINE: NEGATIVE
LYMPHOCYTES ABSOLUTE: 0.2 K/UL (ref 1–5.1)
LYMPHOCYTES RELATIVE PERCENT: 5.8 %
MCH RBC QN AUTO: 32 PG (ref 26–34)
MCHC RBC AUTO-ENTMCNC: 34.1 G/DL (ref 31–36)
MCV RBC AUTO: 93.7 FL (ref 80–100)
MICROSCOPIC EXAMINATION: NORMAL
MONOCYTES ABSOLUTE: 0.8 K/UL (ref 0–1.3)
MONOCYTES RELATIVE PERCENT: 18 %
NEUTROPHILS ABSOLUTE: 3.2 K/UL (ref 1.7–7.7)
NEUTROPHILS RELATIVE PERCENT: 75.1 %
NITRITE, URINE: NEGATIVE
PDW BLD-RTO: 13.6 % (ref 12.4–15.4)
PH UA: 5.5 (ref 5–8)
PLATELET # BLD: 155 K/UL (ref 135–450)
PMV BLD AUTO: 9.3 FL (ref 5–10.5)
POTASSIUM REFLEX MAGNESIUM: 3.8 MMOL/L (ref 3.5–5.1)
PROTEIN UA: NEGATIVE MG/DL
RBC # BLD: 4.53 M/UL (ref 4.2–5.9)
RSV RAPID ANTIGEN: NEGATIVE
S PYO AG THROAT QL: NEGATIVE
SARS-COV-2 RNA, RT PCR: DETECTED
SODIUM BLD-SCNC: 135 MMOL/L (ref 136–145)
SPECIFIC GRAVITY UA: 1.01 (ref 1–1.03)
TOTAL PROTEIN: 6.9 G/DL (ref 6.4–8.2)
URINE REFLEX TO CULTURE: NORMAL
URINE TYPE: NORMAL
UROBILINOGEN, URINE: 0.2 E.U./DL
WBC # BLD: 4.3 K/UL (ref 4–11)

## 2023-01-30 PROCEDURE — 87077 CULTURE AEROBIC IDENTIFY: CPT

## 2023-01-30 PROCEDURE — 87081 CULTURE SCREEN ONLY: CPT

## 2023-01-30 PROCEDURE — 87807 RSV ASSAY W/OPTIC: CPT

## 2023-01-30 PROCEDURE — 99284 EMERGENCY DEPT VISIT MOD MDM: CPT

## 2023-01-30 PROCEDURE — 80053 COMPREHEN METABOLIC PANEL: CPT

## 2023-01-30 PROCEDURE — 87880 STREP A ASSAY W/OPTIC: CPT

## 2023-01-30 PROCEDURE — 96372 THER/PROPH/DIAG INJ SC/IM: CPT

## 2023-01-30 PROCEDURE — 6360000002 HC RX W HCPCS: Performed by: PHYSICIAN ASSISTANT

## 2023-01-30 PROCEDURE — 71046 X-RAY EXAM CHEST 2 VIEWS: CPT

## 2023-01-30 PROCEDURE — 85025 COMPLETE CBC W/AUTO DIFF WBC: CPT

## 2023-01-30 PROCEDURE — 87636 SARSCOV2 & INF A&B AMP PRB: CPT

## 2023-01-30 PROCEDURE — 81003 URINALYSIS AUTO W/O SCOPE: CPT

## 2023-01-30 PROCEDURE — 36415 COLL VENOUS BLD VENIPUNCTURE: CPT

## 2023-01-30 PROCEDURE — 6370000000 HC RX 637 (ALT 250 FOR IP): Performed by: PHYSICIAN ASSISTANT

## 2023-01-30 RX ORDER — KETOROLAC TROMETHAMINE 30 MG/ML
15 INJECTION, SOLUTION INTRAMUSCULAR; INTRAVENOUS ONCE
Status: COMPLETED | OUTPATIENT
Start: 2023-01-30 | End: 2023-01-30

## 2023-01-30 RX ORDER — ALBUTEROL SULFATE 90 UG/1
2 AEROSOL, METERED RESPIRATORY (INHALATION) 4 TIMES DAILY PRN
Qty: 18 G | Refills: 0 | Status: SHIPPED | OUTPATIENT
Start: 2023-01-30

## 2023-01-30 RX ORDER — ACETAMINOPHEN 500 MG
1000 TABLET ORAL ONCE
Status: COMPLETED | OUTPATIENT
Start: 2023-01-30 | End: 2023-01-30

## 2023-01-30 RX ADMIN — ACETAMINOPHEN 1000 MG: 500 TABLET ORAL at 20:31

## 2023-01-30 RX ADMIN — KETOROLAC TROMETHAMINE 15 MG: 30 INJECTION, SOLUTION INTRAMUSCULAR at 19:49

## 2023-01-30 ASSESSMENT — PAIN - FUNCTIONAL ASSESSMENT: PAIN_FUNCTIONAL_ASSESSMENT: 0-10

## 2023-01-30 ASSESSMENT — PAIN DESCRIPTION - LOCATION
LOCATION: GENERALIZED
LOCATION: GENERALIZED;THROAT;HEAD

## 2023-01-30 ASSESSMENT — PAIN SCALES - GENERAL
PAINLEVEL_OUTOF10: 7
PAINLEVEL_OUTOF10: 4

## 2023-01-30 ASSESSMENT — PAIN DESCRIPTION - DESCRIPTORS: DESCRIPTORS: ACHING;SORE

## 2023-01-30 NOTE — ED PROVIDER NOTES
[address] ED  EMERGENCY DEPARTMENT ENCOUNTER        Pt Name: Sg Alcaraz  MRN: [id number]  [place] 1995  Date of evaluation: 1/30/2023  Provider: BART Partida  PCP: No primary care provider on file. Note Started: 5:35 PM EST 1/30/23      HARRY. I have evaluated this patient. My supervising physician was available for consultation. CHIEF COMPLAINT       Chief Complaint   Patient presents with    Fever     Pt presents with c/o fever, sore throat and generalized body aches that started last night. Reports his children recently had strep. HISTORY OF PRESENT ILLNESS: 1 or more Elements     History from : Patient    Limitations to history : None    Sg Alcaraz is a 32 y.o. male with past medical history of asthma who presents via private vehicle from his home for evaluation of sore throat, back pain, generalized body aches who presents via private vehicle from his home for evaluation of fever. Patient Has sore throat as well. Mild headaches. Has been taking antipyretics today with improvement in the fever. He endorses generalized back pain. Nursing Notes were all reviewed and agreed with or any disagreements were addressed in the HPI. REVIEW OF SYSTEMS :      Review of Systems    Positives and Pertinent negatives as per HPI.      SURGICAL HISTORY     Past Surgical History:   Procedure Laterality Date    COLONOSCOPY N/A 9/10/2020    COLONOSCOPY BIOPSY performed by Neeta Arechiga MD at [facility]      adnoids removed    TYMPANOSTOMY TUBE PLACEMENT         [address]       Discharge Medication List as of 1/30/2023  8:37 PM        CONTINUE these medications which have NOT CHANGED    Details   ibuprofen (IBU) 600 MG tablet Take 1 tablet by mouth every 6 hours as needed for Pain, Disp-20 tablet, R-0Normal      benzocaine (ORAJEL) 20 % GEL mucosal gel Take 1 Squirt by mouth 2 times daily as needed for Pain, Disp-9 g, R-0Normal      cyclobenzaprine (FLEXERIL) 10 MG tablet TAKE 1 TABLET BY MOUTH THREE TIMES DAILY AS NEEDED FOR MUSCLE SPASMHistorical Med      diclofenac (VOLTAREN) 75 MG EC tablet TAKE 1 TABLET BY MOUTH ONCE DAILY WITH FOODHistorical Med      COVID-19 Specimen Collection KIT TEST AS DIRECTEDHistorical Med      colestipol (COLESTID) 1 g tablet Take 1 tablet by mouth 2 times daily, Disp-60 tablet,R-3Normal      calcium carbonate (TUMS) 500 MG chewable tablet Take 1 tablet by mouth dailyHistorical Med      dicyclomine (BENTYL) 10 MG capsule Take 1 capsule by mouth every 8 hours as needed (abdominal pain), Disp-40 capsule,R-0Normal             ALLERGIES     Patient has no known allergies. FAMILYHISTORY     History reviewed. No pertinent family history. SOCIAL HISTORY       Social History     Tobacco Use    Smoking status: Every Day     Packs/day: 1.00     Types: Cigarettes    Smokeless tobacco: Former   Vaping Use    Vaping Use: Never used   Substance Use Topics    Alcohol use: Yes     Alcohol/week: 7.0 standard drinks     Types: 7 Cans of beer per week     Comment: daily    Drug use: No       SCREENINGS        Doss Coma Scale  Eye Opening: Spontaneous  Best Verbal Response: Oriented  Best Motor Response: Obeys commands  Doss Coma Scale Score: 15                CIWA Assessment  BP: (!) 140/77  Heart Rate: 98           PHYSICAL EXAM  1 or more Elements     ED Triage Vitals [01/30/23 1613]   BP Temp Temp Source Heart Rate Resp SpO2 Height Weight   116/74 (!) 100.6 °F (38.1 °C) Oral (!) 115 18 99 % 5' 6\" (1.676 m) 191 lb (86.6 kg)       Physical Exam  Vitals and nursing note reviewed. Constitutional:       General: He is not in acute distress. Appearance: He is well-developed. He is not ill-appearing, toxic-appearing or diaphoretic. HENT:      Head: Normocephalic and atraumatic. Jaw: There is normal jaw occlusion.       Salivary Glands: Right salivary gland is not diffusely enlarged or tender. Left salivary gland is not diffusely enlarged or tender. Right Ear: External ear normal.      Left Ear: External ear normal.      Nose: Nose normal. No congestion or rhinorrhea. Mouth/Throat:      Mouth: No angioedema. Pharynx: Oropharynx is clear. Uvula midline. Posterior oropharyngeal erythema present. Tonsils: No tonsillar exudate or tonsillar abscesses. 1+ on the right. 1+ on the left. Comments: No trismus no angioedema, floor of mouth is soft, no evidence of deep neck space infection. Eyes:      General:         Right eye: No discharge. Left eye: No discharge. Neck:      Comments: No meningismus  Cardiovascular:      Rate and Rhythm: Regular rhythm. Tachycardia present. Pulses: Normal pulses. Heart sounds: Normal heart sounds. No murmur heard. No friction rub. No gallop. Pulmonary:      Effort: Pulmonary effort is normal. No respiratory distress. Breath sounds: Normal breath sounds. No stridor. No wheezing, rhonchi or rales. Abdominal:      Palpations: Abdomen is soft. Tenderness: There is no abdominal tenderness. Musculoskeletal:      Cervical back: Full passive range of motion without pain, normal range of motion and neck supple. Lymphadenopathy:      Cervical: No cervical adenopathy. Skin:     General: Skin is warm and dry. Capillary Refill: Capillary refill takes less than 2 seconds. Neurological:      General: No focal deficit present. Mental Status: He is alert and oriented to person, place, and time.    Psychiatric:         Behavior: Behavior normal.           DIAGNOSTIC RESULTS   LABS:    Labs Reviewed   COVID-19 & INFLUENZA COMBO - Abnormal; Notable for the following components:       Result Value    SARS-CoV-2 RNA, RT PCR DETECTED (*)     All other components within normal limits   CBC WITH AUTO DIFFERENTIAL - Abnormal; Notable for the following components:    Lymphocytes Absolute 0.2 (*)     All other components within normal limits   COMPREHENSIVE METABOLIC PANEL W/ REFLEX TO MG FOR LOW K - Abnormal; Notable for the following components:    Sodium 135 (*)     Glucose 124 (*)     All other components within normal limits   RSV RAPID ANTIGEN   STREP SCREEN GROUP A THROAT   CULTURE, BETA STREP CONFIRM PLATES   URINALYSIS WITH REFLEX TO CULTURE       When ordered only abnormal lab results are displayed. All other labs were within normal range or not returned as of this dictation. EKG: When ordered, EKG's are interpreted by the Emergency Department Physician in the absence of a cardiologist.  Please see their note for interpretation of EKG. RADIOLOGY:   Non-plain film images such as CT, Ultrasound and MRI are read by the radiologist. Plain radiographic images are visualized and preliminarily interpreted by the ED Provider with the below findings:        Interpretation per the Radiologist below, if available at the time of this note:    XR CHEST (2 VW)   Final Result   No acute cardiopulmonary process. No results found. No results found. PROCEDURES   Unless otherwise noted below, none     Procedures    CRITICAL CARE TIME (.cctime)       PAST MEDICAL HISTORY      has a past medical history of Asthma. Chronic Conditions affecting Care: asthma     EMERGENCY DEPARTMENT COURSE and DIFFERENTIAL DIAGNOSIS/MDM:   Vitals:    Vitals:    01/30/23 1613 01/30/23 2027   BP: 116/74 (!) 140/77   Pulse: (!) 115 98   Resp: 18 18   Temp: (!) 100.6 °F (38.1 °C) (!) 101.5 °F (38.6 °C)   TempSrc: Oral Oral   SpO2: 99% 99%   Weight: 191 lb (86.6 kg)    Height: 5' 6\" (1.676 m)        Patient was given the following medications:  Medications   ketorolac (TORADOL) injection 15 mg (15 mg IntraMUSCular Given 1/30/23 1949)   acetaminophen (TYLENOL) tablet 1,000 mg (1,000 mg Oral Given 1/30/23 2031)             Is this patient to be included in the SEP-1 Core Measure due to severe sepsis or septic shock?    No   Exclusion criteria - the patient is NOT to be included for SEP-1 Core Measure due to:  Viral etiology found or highly suspected (including COVID-19) without concomitant bacterial infection    CONSULTS: (Who and What was discussed)  None              I utilized an evidence-based risk rating tool (CMT) along with my training and experience to weigh the risk of discharge against the risks of further testing, imaging, or hospitalization. At this time I estimate the risks of additional testing, imaging, or hospitalization to be equal to or greater than the risk of discharge. I discussed my risk assessment with the patient and the patient consents to the risk of discharge as well as the risk of uncertainty in estimating outcomes. At this time, for this patient with known or suspected COVID-19, the risk of acute decompensation in the short term is most likely lower than the risk of additional testing/imaging or hospitalization. CC/HPI Summary, DDx, ED Course, and Reassessment: Patient seen and evaluated. Old records reviewed. Diagnostic testing reviewed and results discussed. I have independently evaluated this patient based upon my scope of practice. Supervising physician was in the department for consultation as needed. Patient is a very pleasant 24-year-old male who presents for evaluation of fever. Patient seen and evaluated by myself history history obtained from patient. Exam is notable for nontoxic appearing adult male, vital signs remarkable for fever with compensatory tachycardia. He has stable blood pressure he is not tachycardic he has no acute respiratory distress. He has no evidence of deep neck space infection despite the fact that he has a sore throat he has mild cobblestoning to the posterior oropharynx consistent with postnasal drip. He has no meningismus no evidence of deep neck space infection. His abdomen is soft nontender. Lungs are clear to auscultation bilaterally. And strep.   He confirms to me that his  He has no focal neurodeficits. Patient was tested for COVID flu RSV children had group A strep. He also had urinalysis and chest x-ray. Chest x-ray interpreted by radiologist as negative for acute cardiopulmonary process. RSV is negative, rapid strep is negative culture pending. COVID is positive in the department, flu negative. Patient is unvaccinated from [facility] but he is not describing any chest pain he has no acute respiratory distress no sign of COVID-pneumonia and at this time I believe him to be very low risk and a reasonable candidate for discharge home. He does have documented history of asthma I do not appreciate any wheezing or any evidence of respiratory distress or asthma exacerbation at this time but he will be given a refill of his albuterol inhaler with a spacer. For the reason that he has a documented history of asthma he is meeting criteria for treatment with Paxlovid. I discussed the risks and benefits of this drug with the patient and I went over the screening eligibility checklist with the patient. Patient was notified that this is a drug that is approved under emergency use authorization and he was given the fact sheet for patients patient parents and caregivers regarding this drug. Patient is wanting treatment with this medication. He will be given a prescription. At this time I do believe patient is a reasonable candidate for discharge home, he does continue to have a mild fever in the department, he was given IM Toradol and then he was subsequently given acetaminophen but he no longer has compensatory tachycardia therefore I believe he is reasonable for discharge home at this time and patient is wanting to go home. Disposition Considerations (include 1 Tests not done, Shared Decision Making, Pt Expectation of Test or Tx.): The patient is low risk for mortality from covid-19 based on demographic, history and clinical factors.  Given the best available information and clinical assessment, I estimate the risk of hospitalization to be greater than the risk of treatment at home. I have explained to the patient that the risk could rapidly change, given precautions for return and instructions on how to minimize being contagious. Pt instructed to follow up for new/worsening symptoms such as SOB, lightheadedness, syncope, chest pain, hemoptysis, worsening fever, or SpO2<90%     At this time I have low concern for ARDS, septicemia, PE. Pt is in agreement with the current plan and all questions were addressed. Appropriate for outpatient management        I am the Primary Clinician of Record. FINAL IMPRESSION      1. COVID          DISPOSITION/PLAN     DISPOSITION Decision To Discharge 01/30/2023 08:37:35 PM      PATIENT REFERRED TO:  Sharri Yefriantonieta  [phone]  Schedule an appointment as soon as possible for a visit       [facility]  [address] [facility]  Go to   If symptoms worsen    DISCHARGE MEDICATIONS:  Discharge Medication List as of 1/30/2023  8:37 PM        START taking these medications    Details   nirmatrelvir/ritonavir (PAXLOVID, 300/100,) 20 x 150 MG & 10 x 100MG TBPK Take 3 tablets (two 150 mg nirmatrelvir and one 100 mg ritonavir tablets) by mouth every 12 hours for 5 days. , Disp-30 tablet, R-0Normal      albuterol sulfate HFA (VENTOLIN HFA) 108 (90 Base) MCG/ACT inhaler Inhale 2 puffs into the lungs 4 times daily as needed for Wheezing, Disp-18 g, R-0Normal      Spacer/Aero-Holding Chambers ELAYNE DAILY Starting Mon 1/30/2023, Disp-1 each, R-0, Normal             DISCONTINUED MEDICATIONS:  Discharge Medication List as of 1/30/2023  8:37 PM        STOP taking these medications       naproxen (NAPROSYN) 500 MG tablet Comments:   Reason for Stopping:                      (Please note that portions of this note were completed with a voice recognition program.  Efforts were made to edit the dictations but occasionally words are mis-transcribed.)    BART Lopez (electronically signed)           Mavis Lopez  01/30/23 2046

## 2023-01-30 NOTE — ED TRIAGE NOTES
Pt presents with c/o fever, sore throat and generalized body aches that started last night. Reports his children recently had strep.

## 2023-01-31 NOTE — DISCHARGE INSTRUCTIONS
Most people with respiratory infections like colds, the flu, and Coronavirus Disease (COVID-19) will have mild illness and can get better with appropriate home care and without the need to see a medical provider. People who are elderly, pregnant, or have a weak immune system, or other medical problem are at higher risk of more serious illness or complications. It is recommended that they carefully monitor their symptoms closely and seek medical care early. Treatment   There is no specific treatment for most viruses, including those that cause the common cold and those that cause COVID-19. Sometimes there is treatment for viruses that cause influenza if given soon after the onset of symptoms. Antibiotics treat infections caused by bacteria, but they do not work against viruses. Most people recover on their own from these viruses, including COVID-19. Here are steps that you can take to help you get better:      Rest     Drink plenty of fluids     Take over-the-counter cold and flu medications to reduce fever and pain. Follow the instructions on the package, unless your doctor gave you specific instructions. Note that these medicines do not 'cure' the illness and do not stop you from spreading germs. When to [address] [place] [address] should seek medical care if you are not getting better within a week, or if your symptoms get worse. It is best to call your doctor ahead of time to discuss your symptoms, if possible. Some providers offer telemedicine services that can assist as well. This may allow you to receive the advice you need by phone. By avoiding a visit to a healthcare facility, you protect yourself from getting a new infection and protect others from catching an infection from you. If you do visit a healthcare facility, put on a mask to protect other patients and staff.      It is recommended that you seek medical care and return immediately to the Emergency Department for any serious symptoms, such as: Return immediately for any difficulty breathing, confusion, dizziness or passing out, vomiting, chest pain, high fevers, weakness, or any other new or concerning symptoms. ** People with potentially life-threatening symptoms should call 911. If possible, put on a facemask before emergency medical services arrive. What should I do if home isolation has been recommended? The following instructions are provided to assist you to safely care for yourself or others who are infected or potentially infected with COVID-19. These instructions are also available at:   [employer]ide.fi    It has been determined that you do not need to be hospitalized at this time, and can safely be isolated at home. You should follow the prevention steps below until a health care provider or local health department says you can return to your normal activities. Stay home except to get medical care  You should restrict activities outside your home, except for getting medical care. Under no circumstance should you go to work, school, or public areas. Avoid using public transportation, ride sharing, or taxis. Separate yourself from other people and animals in your home  People: As much as possible, you should stay in a specific room and away from other people in your home. Also, you should use a separate bathroom, if available. Animals: You should restrict contact with pets and other animals while you are sick with COVID-19, just like you would around other people. Although there have not been reports of pets or other animals becoming sick with COVID-19, it is still recommended that people with COVID-19 limit contact with animals until more information is known about the virus. When possible, have another member of your household care for your animals while you are sick.  If you must care for your pet or be around animals while you are sick, wash your hands before and after you interact with pets and wear a facemask. Call ahead before visiting your doctor  If you have a medical appointment, call the health care provider prior to your appointment and tell them that you have or may have COVID-19. This will help the health care provider's office take steps to keep other people from getting infected or exposed. Ask your health care provider to call the local or state health department. Persons who are placed under active monitoring or self-monitoring should follow instructions provided by their local health department or occupational health professionals, as appropriate. If you have a medical emergency and need to call 911, notify the dispatch personnel that you have, or are being evaluated for COVID-19. If possible, put on a facemask before emergency medical services arrive. Take care of your mental health  You might be feeling anxious, afraid, lonely or uncertain. The following link has a guide with a list of helpful behavioral health resources and a few tips for taking care of your emotional health while you are quarantined:   https://[email or web address] [facility].gov/system/files/tgl98-9116. pdf     Wear a face mask  You should wear a facemask when you are around other people (for example, sharing a room or vehicle) or pets, and before you enter a health care provider's office. If you are not able to wear a facemask (for example, because it causes trouble breathing), then people who live with you should not stay in the same room with you, or they should wear a facemask if they enter your room. Cover your coughs and sneezes  Cover your mouth and nose with a tissue when you cough or sneeze. Throw used tissues in a lined trash can.     Clean your hands often  Wash your hands often with soap and water for at least 20 seconds or clean your hands with an alcohol-based hand  that contains 60 to 95% alcohol, covering all surfaces of your hands and rubbing them together until they feel dry. Soap and water should be used preferentially if your hands are visibly dirty. Avoid touching your eyes, nose, and mouth with unwashed hands. Avoid touching your face  Viruses that affect the respiratory system enter the body through mucous membranes which are found in the eyes, nose, and mouth. It only takes one touch for germs on your fingers to enter your body through your nostrils, eyes, or mouth. In addition to hand hygiene, this is an important way to help prevent transmission of infections. Clean all high-touch surfaces every day  High-touch surfaces include counters, tabletops, doorknobs, bathroom fixtures, toilets, phones, keyboards, tablets, and bedside tables. Also, clean any surfaces that may have blood, stool, or body fluids on them. Use a household cleaning spray or wipe, according to the label instructions. Labels contain instructions for safe and effective use of the cleaning product including precautions you should take when applying the product, such as wearing gloves and making sure you have good ventilation while using the product. Avoid sharing personal household items  You should not share dishes, drinking glasses, cups, eating utensils, towels or bedding with other people or pets in your home. After using these items, they should be washed thoroughly with soap and water. Monitor your symptoms  Please contact the [address] as soon as possible. Persons who are placed under active monitoring or facilitated self-monitoring should follow instructions provided by their local health department or occupational health professionals, as appropriate. Additional information is available at:     www.coronavirus. gov    Seek immediate medical attention if your illness is worsening of if you develop any of the following: difficulty breathing, confusion, dizziness or passing out, vomiting, high fevers, weakness, or any other new or concerning symptoms.  Before seeking care, call your health care provider and tell them that you have, or are being evaluated for COVID-19. Put on a facemask before you enter the facility. These steps will help keep other people in the office or waiting room from getting infected or exposed. If you have a medical emergency and need to call 911, notify the dispatch personnel that you have, or are being evaluated for COVID-19. If possible, put on a face mask before emergency medical services arrive. Discontinuing home isolation  Patients with confirmed COVID-19 should remain under home isolation precautions until the risk of secondary transmission to others is thought to be low. The decision to discontinue home isolation precautions is made on a case-by-case basis, in consultation with health care providers, and state and local health departments. Recommended precautions for household members, intimate partners, and caregivers  If you are providing care for a person infected or suspected to be infected with COVID-19, please note the following instructions, which are also available at: HipGe[employer].fi     How do I take care of someone who is quarantined in my home? Household members, intimate partners, and caregivers in a non-health care setting may have close contact (within 6 feet) with a person with symptomatic, laboratory-confirmed COVID-19 or a person under investigation. Those in close contact should monitor their health and should call their health care provider right away if they develop symptoms suggestive of COVID-19 (such as fever, cough, shortness of breath). Those in close contact should also follow these recommendations:  Make sure that you understand and can help the patient follow their health care provider's instructions for medication(s) and care.  You should help the patient with basic needs in the home and provide support for getting groceries, prescriptions and other personal needs  Monitor the patient's symptoms. If the patient is getting sicker, call his or her health care provider and tell them that the patient has laboratory-confirmed or is under investigation for COVID-19. This will help the health care provider's office take steps to keep other people in the office or waiting room from getting infected. Ask the health care provider to call the local or [facility] health department for additional guidance. If the patient has a medical emergency and you need to call 911, notify the dispatch personnel that the patient has, or is being evaluated for COVID-19  Household members should stay in another room or be  from the patient as much as possible. Household members should use a separate bedroom and bathroom, if available  Prohibit visitors who do not have an essential need to be in the home  Household members should care for any pets in the home. Do not handle pets or other animals while sick    Make sure that shared spaces in the home have good airflow, such as by an air conditioner or an opened window, weather permitting  Perform hand hygiene frequently. Wash your hands often with soap and water for at least 20 seconds or use an alcohol-based hand  that contains 60 to 95% alcohol, covering all surfaces of your hands and rubbing them together until they feel dry. Soap and water should be used preferentially if hands are visibly dirty  Avoid touching your eyes, nose, and mouth with unwashed hands  You and the patient should wear a facemask if you are in the same room  Wear a disposable facemask and gloves when you touch or have contact with the patient's blood, stool, or body fluids, such as saliva, sputum, nasal mucus, vomit or urine. Do not reuse disposable facemasks and gloves. Throw them away after using them. When removing personal protective equipment, first remove and dispose of gloves.  Then, immediately clean your hands with soap and water or alcohol-based hand . Next, remove and dispose of facemask, and immediately clean your hands again with soap and water or alcohol-based hand   Avoid sharing household items with the patient. You should not share dishes, drinking glasses, cups, eating utensils, towels, bedding or other items. After the patient uses these items, you should wash them thoroughly (see below)  Clean all high-touch surfaces, such as counters, tabletops, doorknobs, bathroom fixtures, toilets, phones, keyboards, tablets and bedside tables, every day. Also, clean any surfaces that may have blood, stool, or body fluids on them  Use a household cleaning spray or wipe, according to the label instructions. Labels contain instructions for safe and effective use of the cleaning product including precautions you should take when applying the product, such as wearing gloves and making sure you have good ventilation during the use of the product  Wash laundry thoroughly  Immediately remove and wash clothes or bedding that have blood, stool, or body fluids on them  Wear disposable gloves while handling soiled items and keep soiled items away from your body. Clean your hands (with soap and water or an alcohol-based hand ) immediately after removing and throwing away your gloves  Read and follow directions on labels for laundry or clothing items and detergent. In general, using a normal laundry detergent according to washing machine instructions and dry thoroughly using the warmest temperatures recommended on the clothing label  Place all used disposable gloves, facemasks, and other contaminated items in a lined container before disposing of them with other household waste. Clean your hands (with soap and water or an alcohol-based hand ) immediately after handling these items.  Soap and water should be used preferentially if hands are visibly dirty  Discuss any additional questions with your state or local health department or health care provider    What if I live with someone who's been told to self-quarantine? If the person you live with is NOT exhibiting respiratory symptoms, you can go about your day-to-day business, and you do not need to be tested or monitored. If the person you live with has respiratory symptoms (such as coughing or sneezing), but has not yet tested positive for COVID-19:  Please make sure to stay home  Monitor your symptoms closely, and seek medical attention if your symptoms are worsening   Avoid public areas and public transportation  Wear a facemask if you are sick  Cover your coughs and sneezes with a tissue, dispose of the tissue and immediately wash your hands  Wash your hands often for at least 20 seconds, and if soap and water are not available, use hand    Avoid touching your eyes, nose or mouth  Avoid sharing personal household items  Clean 'high-touch' surfaces daily  If the person you live with has tested positive for COVID-19, you will be considered a close contact, and will also likely be asked to self-quarantine.

## 2023-02-01 LAB
ORGANISM: ABNORMAL
S PYO THROAT QL CULT: ABNORMAL
S PYO THROAT QL CULT: ABNORMAL

## (undated) DEVICE — ELECTRODE,ECG,STRESS,FOAM,3PK: Brand: MEDLINE

## (undated) DEVICE — FORCEP BX STD CAP 240CM RAD JAW 4

## (undated) DEVICE — ENDO CARRY-ON PROCEDURE KIT INCLUDES SUCTION TUBING, LUBRICANT, GAUZE, BIOHAZARD STICKER, TRANSPORT PAD AND INTERCEPT BEDSIDE KIT.: Brand: ENDO CARRY-ON PROCEDURE KIT

## (undated) DEVICE — CANNULA,OXY,ADULT,SUPERSOFT,W/7'TUB,SC: Brand: MEDLINE INDUSTRIES, INC.

## (undated) DEVICE — Z DISCONTINUED USE 2276105 GOWN PROTCT UNIV CHST W28IN L49IN SL 24IN BLU SPUNBOND FLM